# Patient Record
Sex: FEMALE | Race: OTHER | HISPANIC OR LATINO | Employment: OTHER | URBAN - NONMETROPOLITAN AREA
[De-identification: names, ages, dates, MRNs, and addresses within clinical notes are randomized per-mention and may not be internally consistent; named-entity substitution may affect disease eponyms.]

---

## 2023-06-22 ENCOUNTER — APPOINTMENT (EMERGENCY)
Dept: RADIOLOGY | Facility: HOSPITAL | Age: 73
End: 2023-06-22
Payer: MEDICARE

## 2023-06-22 ENCOUNTER — APPOINTMENT (EMERGENCY)
Dept: CT IMAGING | Facility: HOSPITAL | Age: 73
End: 2023-06-22
Payer: MEDICARE

## 2023-06-22 ENCOUNTER — HOSPITAL ENCOUNTER (EMERGENCY)
Facility: HOSPITAL | Age: 73
Discharge: HOME | End: 2023-06-22
Attending: EMERGENCY MEDICINE | Admitting: EMERGENCY MEDICINE
Payer: MEDICARE

## 2023-06-22 VITALS
HEART RATE: 95 BPM | SYSTOLIC BLOOD PRESSURE: 144 MMHG | DIASTOLIC BLOOD PRESSURE: 72 MMHG | HEIGHT: 62 IN | OXYGEN SATURATION: 91 % | RESPIRATION RATE: 23 BRPM | TEMPERATURE: 98.2 F | BODY MASS INDEX: 53.92 KG/M2 | WEIGHT: 293 LBS

## 2023-06-22 DIAGNOSIS — G44.86 CERVICOGENIC HEADACHE: Primary | ICD-10-CM

## 2023-06-22 DIAGNOSIS — R07.2 RETROSTERNAL CHEST PAIN: ICD-10-CM

## 2023-06-22 PROBLEM — F32.A DEPRESSION: Status: ACTIVE | Noted: 2021-09-16

## 2023-06-22 PROBLEM — M25.50 AROMATASE INHIBITOR-ASSOCIATED ARTHRALGIA: Status: ACTIVE | Noted: 2018-04-06

## 2023-06-22 PROBLEM — Z90.49 S/P LAPAROSCOPIC CHOLECYSTECTOMY: Status: ACTIVE | Noted: 2020-03-12

## 2023-06-22 PROBLEM — G43.709 CHRONIC MIGRAINE WITHOUT AURA WITHOUT STATUS MIGRAINOSUS, NOT INTRACTABLE: Status: ACTIVE | Noted: 2022-06-28

## 2023-06-22 PROBLEM — Z86.010 HISTORY OF COLON POLYPS: Status: ACTIVE | Noted: 2019-04-30

## 2023-06-22 PROBLEM — Z86.0100 HISTORY OF COLON POLYPS: Status: ACTIVE | Noted: 2019-04-30

## 2023-06-22 PROBLEM — M54.81 OCCIPITAL NEURALGIA OF LEFT SIDE: Status: ACTIVE | Noted: 2022-06-28

## 2023-06-22 PROBLEM — M94.0 COSTOCHONDRITIS: Status: ACTIVE | Noted: 2020-07-13

## 2023-06-22 PROBLEM — K29.70 GASTRITIS DETERMINED BY ENDOSCOPY: Status: ACTIVE | Noted: 2019-04-30

## 2023-06-22 PROBLEM — C50.111 MALIGNANT NEOPLASM OF CENTRAL PORTION OF RIGHT BREAST IN FEMALE, ESTROGEN RECEPTOR POSITIVE (HCC): Status: ACTIVE | Noted: 2018-04-06

## 2023-06-22 PROBLEM — T45.1X5A AROMATASE INHIBITOR-ASSOCIATED ARTHRALGIA: Status: ACTIVE | Noted: 2018-04-06

## 2023-06-22 PROBLEM — I10 HTN (HYPERTENSION): Status: ACTIVE | Noted: 2021-09-16

## 2023-06-22 PROBLEM — J44.9 COPD WITH ASTHMA (HCC): Status: ACTIVE | Noted: 2021-09-16

## 2023-06-22 PROBLEM — Z17.0 MALIGNANT NEOPLASM OF CENTRAL PORTION OF RIGHT BREAST IN FEMALE, ESTROGEN RECEPTOR POSITIVE (HCC): Status: ACTIVE | Noted: 2018-04-06

## 2023-06-22 PROBLEM — E66.01 MORBID OBESITY (HCC): Status: ACTIVE | Noted: 2023-03-07

## 2023-06-22 PROBLEM — J44.89 COPD WITH ASTHMA: Status: ACTIVE | Noted: 2021-09-16

## 2023-06-22 PROBLEM — M17.11 OSTEOARTHRITIS OF RIGHT KNEE: Status: ACTIVE | Noted: 2021-04-08

## 2023-06-22 LAB
2HR DELTA HS TROPONIN: 0 NG/L
ANION GAP SERPL CALCULATED.3IONS-SCNC: 7 MMOL/L
BASOPHILS # BLD AUTO: 0.05 THOUSANDS/ÂΜL (ref 0–0.1)
BASOPHILS NFR BLD AUTO: 1 % (ref 0–1)
BUN SERPL-MCNC: 12 MG/DL (ref 5–25)
CALCIUM SERPL-MCNC: 9 MG/DL (ref 8.4–10.2)
CARDIAC TROPONIN I PNL SERPL HS: 4 NG/L
CARDIAC TROPONIN I PNL SERPL HS: 4 NG/L
CHLORIDE SERPL-SCNC: 104 MMOL/L (ref 96–108)
CO2 SERPL-SCNC: 29 MMOL/L (ref 21–32)
CREAT SERPL-MCNC: 0.73 MG/DL (ref 0.6–1.3)
CRP SERPL QL: 3.8 MG/L
EOSINOPHIL # BLD AUTO: 0.14 THOUSAND/ÂΜL (ref 0–0.61)
EOSINOPHIL NFR BLD AUTO: 2 % (ref 0–6)
ERYTHROCYTE [DISTWIDTH] IN BLOOD BY AUTOMATED COUNT: 15.4 % (ref 11.6–15.1)
ERYTHROCYTE [SEDIMENTATION RATE] IN BLOOD: 18 MM/HOUR (ref 0–29)
GFR SERPL CREATININE-BSD FRML MDRD: 81 ML/MIN/1.73SQ M
GLUCOSE SERPL-MCNC: 97 MG/DL (ref 65–140)
HCT VFR BLD AUTO: 39.1 % (ref 34.8–46.1)
HGB BLD-MCNC: 12.2 G/DL (ref 11.5–15.4)
IMM GRANULOCYTES # BLD AUTO: 0.02 THOUSAND/UL (ref 0–0.2)
IMM GRANULOCYTES NFR BLD AUTO: 0 % (ref 0–2)
LYMPHOCYTES # BLD AUTO: 1.62 THOUSANDS/ÂΜL (ref 0.6–4.47)
LYMPHOCYTES NFR BLD AUTO: 23 % (ref 14–44)
MAGNESIUM SERPL-MCNC: 1.9 MG/DL (ref 1.9–2.7)
MCH RBC QN AUTO: 25.9 PG (ref 26.8–34.3)
MCHC RBC AUTO-ENTMCNC: 31.2 G/DL (ref 31.4–37.4)
MCV RBC AUTO: 83 FL (ref 82–98)
MONOCYTES # BLD AUTO: 0.9 THOUSAND/ÂΜL (ref 0.17–1.22)
MONOCYTES NFR BLD AUTO: 13 % (ref 4–12)
NEUTROPHILS # BLD AUTO: 4.3 THOUSANDS/ÂΜL (ref 1.85–7.62)
NEUTS SEG NFR BLD AUTO: 61 % (ref 43–75)
NRBC BLD AUTO-RTO: 0 /100 WBCS
PLATELET # BLD AUTO: 267 THOUSANDS/UL (ref 149–390)
PMV BLD AUTO: 10.2 FL (ref 8.9–12.7)
POTASSIUM SERPL-SCNC: 3.4 MMOL/L (ref 3.5–5.3)
RBC # BLD AUTO: 4.71 MILLION/UL (ref 3.81–5.12)
SODIUM SERPL-SCNC: 140 MMOL/L (ref 135–147)
WBC # BLD AUTO: 7.03 THOUSAND/UL (ref 4.31–10.16)

## 2023-06-22 PROCEDURE — 85025 COMPLETE CBC W/AUTO DIFF WBC: CPT | Performed by: EMERGENCY MEDICINE

## 2023-06-22 PROCEDURE — 93005 ELECTROCARDIOGRAM TRACING: CPT

## 2023-06-22 PROCEDURE — 70496 CT ANGIOGRAPHY HEAD: CPT

## 2023-06-22 PROCEDURE — 80048 BASIC METABOLIC PNL TOTAL CA: CPT | Performed by: EMERGENCY MEDICINE

## 2023-06-22 PROCEDURE — 36415 COLL VENOUS BLD VENIPUNCTURE: CPT | Performed by: EMERGENCY MEDICINE

## 2023-06-22 PROCEDURE — 83735 ASSAY OF MAGNESIUM: CPT | Performed by: EMERGENCY MEDICINE

## 2023-06-22 PROCEDURE — 71045 X-RAY EXAM CHEST 1 VIEW: CPT

## 2023-06-22 PROCEDURE — 70498 CT ANGIOGRAPHY NECK: CPT

## 2023-06-22 PROCEDURE — G1004 CDSM NDSC: HCPCS

## 2023-06-22 PROCEDURE — 84484 ASSAY OF TROPONIN QUANT: CPT | Performed by: EMERGENCY MEDICINE

## 2023-06-22 PROCEDURE — 85652 RBC SED RATE AUTOMATED: CPT | Performed by: EMERGENCY MEDICINE

## 2023-06-22 PROCEDURE — 86140 C-REACTIVE PROTEIN: CPT | Performed by: EMERGENCY MEDICINE

## 2023-06-22 RX ORDER — PREDNISONE 20 MG/1
40 TABLET ORAL DAILY
Qty: 8 TABLET | Refills: 0 | Status: SHIPPED | OUTPATIENT
Start: 2023-06-23 | End: 2023-06-27

## 2023-06-22 RX ORDER — POTASSIUM CHLORIDE 20 MEQ/1
40 TABLET, EXTENDED RELEASE ORAL ONCE
Status: COMPLETED | OUTPATIENT
Start: 2023-06-22 | End: 2023-06-22

## 2023-06-22 RX ORDER — METOCLOPRAMIDE HYDROCHLORIDE 5 MG/ML
10 INJECTION INTRAMUSCULAR; INTRAVENOUS ONCE
Status: COMPLETED | OUTPATIENT
Start: 2023-06-22 | End: 2023-06-22

## 2023-06-22 RX ORDER — MAGNESIUM SULFATE HEPTAHYDRATE 40 MG/ML
2 INJECTION, SOLUTION INTRAVENOUS ONCE
Status: COMPLETED | OUTPATIENT
Start: 2023-06-22 | End: 2023-06-22

## 2023-06-22 RX ORDER — IPRATROPIUM BROMIDE AND ALBUTEROL SULFATE 2.5; .5 MG/3ML; MG/3ML
3 SOLUTION RESPIRATORY (INHALATION) ONCE
Status: COMPLETED | OUTPATIENT
Start: 2023-06-22 | End: 2023-06-22

## 2023-06-22 RX ORDER — DEXAMETHASONE SODIUM PHOSPHATE 4 MG/ML
10 INJECTION, SOLUTION INTRA-ARTICULAR; INTRALESIONAL; INTRAMUSCULAR; INTRAVENOUS; SOFT TISSUE ONCE
Status: COMPLETED | OUTPATIENT
Start: 2023-06-22 | End: 2023-06-22

## 2023-06-22 RX ORDER — DIPHENHYDRAMINE HYDROCHLORIDE 50 MG/ML
25 INJECTION INTRAMUSCULAR; INTRAVENOUS ONCE
Status: COMPLETED | OUTPATIENT
Start: 2023-06-22 | End: 2023-06-22

## 2023-06-22 RX ORDER — SODIUM CHLORIDE 9 MG/ML
3 INJECTION INTRAVENOUS
Status: DISCONTINUED | OUTPATIENT
Start: 2023-06-22 | End: 2023-06-23 | Stop reason: HOSPADM

## 2023-06-22 RX ADMIN — MAGNESIUM SULFATE HEPTAHYDRATE 2 G: 40 INJECTION, SOLUTION INTRAVENOUS at 16:35

## 2023-06-22 RX ADMIN — VALPROATE SODIUM 1000 MG: 100 INJECTION, SOLUTION INTRAVENOUS at 20:55

## 2023-06-22 RX ADMIN — DIPHENHYDRAMINE HYDROCHLORIDE 25 MG: 50 INJECTION, SOLUTION INTRAMUSCULAR; INTRAVENOUS at 16:34

## 2023-06-22 RX ADMIN — IPRATROPIUM BROMIDE AND ALBUTEROL SULFATE 3 ML: .5; 3 SOLUTION RESPIRATORY (INHALATION) at 16:35

## 2023-06-22 RX ADMIN — IOHEXOL 85 ML: 350 INJECTION, SOLUTION INTRAVENOUS at 17:44

## 2023-06-22 RX ADMIN — DEXAMETHASONE SODIUM PHOSPHATE 10 MG: 4 INJECTION, SOLUTION INTRAMUSCULAR; INTRAVENOUS at 16:39

## 2023-06-22 RX ADMIN — POTASSIUM CHLORIDE 40 MEQ: 1500 TABLET, EXTENDED RELEASE ORAL at 17:59

## 2023-06-22 RX ADMIN — METOCLOPRAMIDE HYDROCHLORIDE 10 MG: 5 INJECTION INTRAMUSCULAR; INTRAVENOUS at 16:35

## 2023-06-22 NOTE — ED PROVIDER NOTES
"History  Chief Complaint   Patient presents with   • Chest Pain     Chest pressure that started this morning  Increase in shortness of breath complains of left sided head pain as well, and nausea  18-year-old who presents to the emergency department with her son for evaluation of 2 primary complaints:    1  Left-sided parietal/frontal headache extending into the left neck beginning on Monday, 19 June 2023 with no preceding trauma or injury  Associated with photophobia and nausea as well as phonophobia  Also states that she has had transient blurred vision from the left eye for the past several days without vision loss per se from the left eye and with no vision effects in the right eye  Patient reports a longstanding history of headaches of a similar character (including photophobia/phonophobia and blurred vision) although usually located in the temporoparietal region and not extending into the neck such as she is having now; these were diagnosed previously as migraine headaches  She has also been told that she has \"arthritis\" within multiple locations within her skull that may be contributing to her headaches  Has had previous cervical spine injections for the same symptoms that did not really improve her symptoms  Currently treated by headache specialist for the symptoms  She feels that the parietal region on the left as well as the neck on the left side are swollen and inflamed which is unusual for her  She has taken acetaminophen and ibuprofen as well as prescription migraine abortive therapy without improvement  She does not take any anticoagulant or antiplatelet therapy  A/P: May have a migraine headache but alternative possibilities including vascular pathology with dissection or giant cell arteritis are possible  We will check CTA head/neck as well as ESR/CRP  Will give migraine abortive therapies as well  Disposition pending      2   Retrosternal chest pain she describes as tightness " beginning this morning after awakening that did not respond to use of her inhaler at home  She feels mildly dyspneic since onset of the pain  She had been previously nauseated because of the headache as described and remains nauseated now  Not sweating at any point  No palpitations  no syncope  Pain does not radiate from the chest   It is not obviously affected by physical activity  It remains now essentially at the same intensity as at onset  Patient had taken inhalers suspecting that this might be related to her asthma, although as noted she did not have significant improvement after using an albuterol treatment  Does not have any underlying cardiac disease of which she is aware although does have history of hypertension/hyperlipidemia and aortic valve sclerosis per epic records  Has not had pain like this previously  A/P: DDx includes but is not limited to ACS/dissection/ptx/pna; relatively low concern for dissection given nature of sx and I do not suspect this diagnosis to be likely  Will assess ACS with ecg/troponin  Ptx/pna will be assessed by chest imaging  Does not have evidence of bronchospasm on examination decreases suspicion that this is related to asthma;  Nonetheless will see if symptoms respond to treatment with DuoNeb  History provided by:  Medical records, patient and relative (Patient's son )   used: No (Pateint's son prefers to interpret for patient)    Chest Pain  Associated symptoms: headache, nausea and shortness of breath    Associated symptoms: no abdominal pain, no numbness, not vomiting and no weakness        None       History reviewed  No pertinent past medical history  History reviewed  No pertinent surgical history  History reviewed  No pertinent family history  I have reviewed and agree with the history as documented      E-Cigarette/Vaping     E-Cigarette/Vaping Substances     Social History     Tobacco Use   • Smoking status: Never   • Smokeless tobacco: Never       Review of Systems   Constitutional: Negative  Eyes: Positive for photophobia and visual disturbance  Negative for pain and redness  Respiratory: Positive for chest tightness and shortness of breath  Negative for choking  Cardiovascular: Positive for chest pain  Gastrointestinal: Positive for nausea  Negative for abdominal pain and vomiting  Genitourinary: Negative  Musculoskeletal: Negative  Negative for neck pain and neck stiffness  Skin: Negative  Neurological: Positive for headaches  Negative for syncope, speech difficulty, weakness, light-headedness and numbness  Hematological: Negative  Physical Exam  Physical Exam  Vitals and nursing note reviewed  Constitutional:       General: She is awake  She is not in acute distress  Appearance: Normal appearance  She is well-developed  HENT:      Head: Normocephalic and atraumatic  Comments: Tenderness along the posterior aspect of the left temporal region extending into the left parietal region, the left occipital region, and the left inferior aspect of the neck with no significant muscular hypertonicity or overlying skin changes  No tenderness of the temporal artery per se  No masses/swelling/abnormal texture of the temporal artery  Right Ear: Hearing and external ear normal       Left Ear: Hearing and external ear normal    Eyes:      General: Vision grossly intact  Gaze aligned appropriately  Extraocular Movements: Extraocular movements intact  Conjunctiva/sclera: Conjunctivae normal       Pupils: Pupils are equal, round, and reactive to light  Neck:      Trachea: Trachea and phonation normal       Comments: No posterior midline ttp or stepoff  Cardiovascular:      Rate and Rhythm: Normal rate and regular rhythm  Pulses:           Radial pulses are 2+ on the right side and 2+ on the left side          Dorsalis pedis pulses are 2+ on the right side and 2+ on the left side         Posterior tibial pulses are 2+ on the right side and 2+ on the left side  Heart sounds: Normal heart sounds, S1 normal and S2 normal  No murmur heard  No friction rub  No gallop  Pulmonary:      Effort: Pulmonary effort is normal  No respiratory distress  Breath sounds: Normal breath sounds  No stridor  No decreased breath sounds, wheezing, rhonchi or rales  Abdominal:      General: There is no distension  Palpations: There is no mass  Tenderness: There is no abdominal tenderness  There is no guarding or rebound  Skin:     General: Skin is warm and dry  Neurological:      Mental Status: She is alert and oriented to person, place, and time  GCS: GCS eye subscore is 4  GCS verbal subscore is 5  GCS motor subscore is 6  Cranial Nerves: No cranial nerve deficit  Sensory: No sensory deficit  Motor: No abnormal muscle tone  Comments: PERRLA; EOMI  Sensation intact to light touch over face in V1-V3 distribution bilaterally  Facial expressions symmetric  Tongue/uvula midline  Shoulder shrug equal bilaterally  Strength 5/5 in UE/LE bilaterally  Sensation intact to light touch in UE/LE bilaterally           Vital Signs  ED Triage Vitals   Temperature Pulse Respirations Blood Pressure SpO2   06/22/23 1530 06/22/23 1445 06/22/23 1445 06/22/23 1530 06/22/23 1445   98 2 °F (36 8 °C) 89 (!) 25 136/63 95 %      Temp Source Heart Rate Source Patient Position - Orthostatic VS BP Location FiO2 (%)   06/22/23 1530 06/22/23 1530 06/22/23 1530 06/22/23 1530 --   Temporal Monitor Lying Left arm       Pain Score       --                  Vitals:    06/22/23 1645 06/22/23 1900 06/22/23 1915 06/22/23 2128   BP:    144/72   Pulse: 87 95 95    Patient Position - Orthostatic VS:             Visual Acuity      ED Medications  Medications   sodium chloride (PF) 0 9 % injection 3 mL (has no administration in time range)   diphenhydrAMINE (BENADRYL) injection 25 mg (25 mg Intravenous Given 6/22/23 1634)   metoclopramide (REGLAN) injection 10 mg (10 mg Intravenous Given 6/22/23 1635)   magnesium sulfate 2 g/50 mL IVPB (premix) 2 g (0 g Intravenous Stopped 6/22/23 1806)   ipratropium-albuterol (DUO-NEB) 0 5-2 5 mg/3 mL inhalation solution 3 mL (3 mL Nebulization Given 6/22/23 1635)   dexamethasone (DECADRON) injection 10 mg (10 mg Intravenous Given 6/22/23 1639)   potassium chloride (K-DUR,KLOR-CON) CR tablet 40 mEq (40 mEq Oral Given 6/22/23 1759)   iohexol (OMNIPAQUE) 350 MG/ML injection (SINGLE-DOSE) 85 mL (85 mL Intravenous Given 6/22/23 1744)   valproate (DEPACON) 1,000 mg in sodium chloride 0 9 % 50 mL for headache (0 mg Intravenous Stopped 6/22/23 2116)       Diagnostic Studies  Results Reviewed     Procedure Component Value Units Date/Time    HS Troponin I 2hr [[id number]]  (Normal) Collected: 06/22/23 1820    Lab Status: Final result Specimen: Blood from Arm, Right Updated: 06/22/23 1847     hs TnI 2hr 4 ng/L      Delta 2hr hsTnI 0 ng/L     HS Troponin 0hr (reflex protocol) [[id number]]  (Normal) Collected: 06/22/23 1611    Lab Status: Final result Specimen: Blood from Arm, Right Updated: 06/22/23 1639     hs TnI 0hr 4 ng/L     Sedimentation rate, automated [[id number]]  (Normal) Collected: 06/22/23 1611    Lab Status: Final result Specimen: Blood from Arm, Right Updated: 06/22/23 1638     Sed Rate 18 mm/hour     Basic metabolic panel [[id number]]  (Abnormal) Collected: 06/22/23 1611    Lab Status: Final result Specimen: Blood from Arm, Right Updated: 06/22/23 1632     Sodium 140 mmol/L      Potassium 3 4 mmol/L      Chloride 104 mmol/L      CO2 29 mmol/L      ANION GAP 7 mmol/L      BUN 12 mg/dL      Creatinine 0 73 mg/dL      Glucose 97 mg/dL      Calcium 9 0 mg/dL      eGFR 81 ml/min/1 73sq m     Narrative:      [place] guidelines for Chronic Kidney Disease (CKD):   •  Stage 1 with normal or high GFR (GFR > 90 mL/min/1 73 square meters)  •  Stage 2 Mild CKD (GFR = 60-89 mL/min/1 73 square meters)  •  Stage 3A Moderate CKD (GFR = 45-59 mL/min/1 73 square meters)  •  Stage 3B Moderate CKD (GFR = 30-44 mL/min/1 73 square meters)  •  Stage 4 Severe CKD (GFR = 15-29 mL/min/1 73 square meters)  •  Stage 5 End Stage CKD (GFR <15 mL/min/1 73 square meters)  Note: GFR calculation is accurate only with a steady state creatinine    Magnesium [[id number]]  (Normal) Collected: 06/22/23 1611    Lab Status: Final result Specimen: Blood from Arm, Right Updated: 06/22/23 1632     Magnesium 1 9 mg/dL     C-reactive protein [[id number]]  (Abnormal) Collected: 06/22/23 1611    Lab Status: Final result Specimen: Blood from Arm, Right Updated: 06/22/23 1632     CRP 3 8 mg/L     CBC and differential [[id number]]  (Abnormal) Collected: 06/22/23 1611    Lab Status: Final result Specimen: Blood from Arm, Right Updated: 06/22/23 1615     WBC 7 03 Thousand/uL      RBC 4 71 Million/uL      Hemoglobin 12 2 g/dL      Hematocrit 39 1 %      MCV 83 fL      MCH 25 9 pg      MCHC 31 2 g/dL      RDW 15 4 %      MPV 10 2 fL      Platelets 786 Thousands/uL      nRBC 0 /100 WBCs      Neutrophils Relative 61 %      Immat GRANS % 0 %      Lymphocytes Relative 23 %      Monocytes Relative 13 %      Eosinophils Relative 2 %      Basophils Relative 1 %      Neutrophils Absolute 4 30 Thousands/µL      Immature Grans Absolute 0 02 Thousand/uL      Lymphocytes Absolute 1 62 Thousands/µL      Monocytes Absolute 0 90 Thousand/µL      Eosinophils Absolute 0 14 Thousand/µL      Basophils Absolute 0 05 Thousands/µL                  CTA head and neck with and without contrast   Final Result by Tori Matt MD (06/22 1948)         1  No evidence of acute intracranial hemorrhage  2  No evidence of hemodynamic significant stenosis, aneurysm or dissection  Workstation performed: [id number]         X-ray chest 1 view portable   ED Interpretation by Ventura Pedro DO (06/22 1631)   Airway is midline  Lungs are clear bilaterally with no evidence of pulmonary vascular congestion/focal infiltrate/pleural effusion/pneumothorax  Cardiac and mediastinal silhouettes are within normal limits  Osseous structures appear normal                    Procedures  Procedures         ED Course  ED Course as of 06/22/23 2225   Thu Jun 22, 2023   1554 ECG NSR 87 bpm   QRS 72 QTc 447  No ectopy  Normal axis  LVH  Mild baseline variability  No acute st/t changes  Interpreted by me   1619 CBC and differential(!)  WBC wnl  Hg/Hct wnl  Plt wnl   1649 Sedimentation rate, automated  Normal   1649 Magnesium  Normal   7545 Basic metabolic panel(!)  Hypokalemia  Otherwise normal    1649 C-reactive protein(!)  Very mildly elevated   1649 HS Troponin 0hr (reflex protocol)  Not consistent with ACS; will repeat at T+ 2 hours  1724 The very minimally elevated C-reactive protein and normal ESR this patient with an existing diagnosis of cervicogenic headache/recurrent migraine headache argues against this being giant cell arteritis  I would in fact suspect that this is likely to be a presentation of her migrainous disease, assuming the CTA does not demonstrate any other pathology that accounts for her symptoms  [phone] Patient and her son updated regarding results of work-up thus far  Will be going to CTA shortly  1756 CTA completed and awaiting interpretation  1847 HS Troponin I 2hr  Delta of 0 not consistent with ACS   1951 CTA head and neck with and without contrast  IMPRESSION:        1  No evidence of acute intracranial hemorrhage  2  No evidence of hemodynamic significant stenosis, aneurysm or dissection  1951 Given the results of the work-up, patient's headache is almost certainly related to underlying migrainous/cervicogenic headache syndrome and does not represent GCA/vascular dissection/aneurysmal subarachnoid hemorrhage/etc   Would direct her to her neurologist for further evaluation as indicated    Reviewed this plan with the patient and her son  She is still having moderate amount of pain for which administration of valproate may be reasonable  Patient reevaluated; reports mild improvement of symptoms  Repeat examination finds the patient awake and alert  The patient is in no respiratory distress  The patient is phonating in full sentences with no evidence of dysphonia/stridor  Repeat lung examination finds mildly improved aeration bilaterally with occasional scattered wheezes and no crackles/rhonchi  Presence of mild wheezing after treatment with DuoNeb suggests that dyspnea/chest pain patient is experiencing may be secondary to mild COPD exacerbation  Would add steroid at discharge in addition to the dexamethasone patient received today  Also advised continued use of bronchodilator treatment  2131 Headache markedly improved after valproate administration  Reviewed results of work-up again with the patient and her son  Will prescribe short course of prednisone to address any component of symptoms related to COPD exacerbation, which may in fact be accounting for patient's chest pain  Headache as noted almost certainly related to cervicogenic/migraine picture  Courage patient to see neurologist that she may benefit from additional migraine preventative therapies  She did show me a prescription for aspirin/butalbital that she has been described for the past several months but notes that it does not help for her  She may benefit from medication such as valproate or topiramate to prevent migraine headaches  Again, this should be discussed in full with her neurologist   Return for any signs or symptoms of ACS  All questions were answered to patient's satisfaction prior to discharge  She and her son expressed understanding and agreed to plan  SBIRT 20yo+    Flowsheet Row Most Recent Value   Initial Alcohol Screen: US AUDIT-C     1   How often do you have a drink containing alcohol? 0 Filed at: 06/22/2023 1438   2  How many drinks containing alcohol do you have on a typical day you are drinking? 0 Filed at: 06/22/2023 1438   3b  FEMALE Any Age, or MALE 65+: How often do you have 4 or more drinks on one occassion? 0 Filed at: 06/22/2023 1438   Audit-C Score 0 Filed at: 06/22/2023 1438   RINKU: How many times in the past year have you    Used an illegal drug or used a prescription medication for non-medical reasons? Never Filed at: 06/22/2023 1438          MDM    Disposition  Final diagnoses:   Mixed cervicogenic/migraine headache   Retrosternal chest pain     Time reflects when diagnosis was documented in both MDM as applicable and the Disposition within this note     Time User Action Codes Description Comment    6/22/2023  9:24 PM Rozena Eron Add [G44 86] Cervicogenic headache     6/22/2023  9:24 PM Rozena Barge Modify [L17 66] Mixed cervicogenic/migraine headache     6/22/2023  9:24 PM Rozena Bargayatri Add [R07 2] Retrosternal chest pain       ED Disposition     ED Disposition   Discharge    Condition   Stable    Date/Time   Thu Jun 22, 2023  9:24 PM    Comment   Bety Arreola discharge to home/self care  Follow-up Information    None         Discharge Medication List as of 6/22/2023  9:26 PM      START taking these medications    Details   predniSONE 20 mg tablet Take 2 tablets (40 mg total) by mouth daily for 4 days Do not start before June 23, 2023 , Starting Fri 6/23/2023, Until Tue 6/27/2023, Normal             No discharge procedures on file      PDMP Review     None          ED Provider  Electronically Signed by           Leonidas Jones DO  06/22/23 2222

## 2023-06-23 LAB
ATRIAL RATE: 87 BPM
P AXIS: 51 DEGREES
PR INTERVAL: 154 MS
QRS AXIS: -19 DEGREES
QRSD INTERVAL: 72 MS
QT INTERVAL: 372 MS
QTC INTERVAL: 447 MS
T WAVE AXIS: 30 DEGREES
VENTRICULAR RATE: 87 BPM

## 2023-06-23 PROCEDURE — 93010 ELECTROCARDIOGRAM REPORT: CPT | Performed by: INTERNAL MEDICINE

## 2023-06-23 NOTE — DISCHARGE INSTRUCTIONS
Please continue all your medications at their current dosages and frequencies  You have been prescribed prednisone which you should start taking tomorrow (June 23)  You should make an appointment with your neurologist in the next 2-3 weeks to discuss further  If you develop chest pain that is worse with physical activity, chest pain that causes you to pass out, chest pain with vomiting, or chest pain that moves out of your chest, please go to the ER

## 2023-10-12 ENCOUNTER — OFFICE VISIT (OUTPATIENT)
Dept: URGENT CARE | Facility: CLINIC | Age: 73
End: 2023-10-12
Payer: COMMERCIAL

## 2023-10-12 VITALS — HEART RATE: 85 BPM | TEMPERATURE: 97.9 F | RESPIRATION RATE: 20 BRPM | OXYGEN SATURATION: 94 %

## 2023-10-12 DIAGNOSIS — J01.90 ACUTE SINUSITIS, RECURRENCE NOT SPECIFIED, UNSPECIFIED LOCATION: Primary | ICD-10-CM

## 2023-10-12 PROCEDURE — 99213 OFFICE O/P EST LOW 20 MIN: CPT

## 2023-10-12 PROCEDURE — S9088 SERVICES PROVIDED IN URGENT: HCPCS

## 2023-10-12 RX ORDER — AMOXICILLIN AND CLAVULANATE POTASSIUM 875; 125 MG/1; MG/1
1 TABLET, FILM COATED ORAL EVERY 12 HOURS SCHEDULED
Qty: 14 TABLET | Refills: 0 | Status: SHIPPED | OUTPATIENT
Start: 2023-10-12 | End: 2023-10-19

## 2023-10-12 RX ORDER — FLUTICASONE PROPIONATE 50 MCG
2 SPRAY, SUSPENSION (ML) NASAL DAILY
Qty: 11.1 ML | Refills: 0 | Status: SHIPPED | OUTPATIENT
Start: 2023-10-12

## 2023-10-12 NOTE — PROGRESS NOTES
[facility]        NAME: Kelechi Jacob is a 75 y.o. female  : 1950    MRN: [id number]  DATE: 2023  TIME: 7:56 PM    Assessment and Plan   Acute sinusitis, recurrence not specified, unspecified location [J01.90]  1. Acute sinusitis, recurrence not specified, unspecified location  amoxicillin-clavulanate (AUGMENTIN) 875-125 mg per tablet    fluticasone (FLONASE) 50 mcg/act nasal spray        Maxillary and frontal sinus pressure, pain, and tenderness on exam.  PT has history of sinus infections in the past.  Will start on Augmentin. Also sending in Flonase and recommended nasal saline rinses. given advice for at home remedies. Advised follow-up with family doctor if no improvement. Advised with emergency room if symptoms worsen. Patient Instructions     Take prescribed medication as instructed. Do not take on an empty stomach. Eat yogurt with active and live cultures prior to or after taking each antibiotic dose. Tylenol for pain or fever. Make sure to do prescribed nasal spray followed by nasal saline spray over-the-counter twice daily. Continue with asthma regimen inhalers. If no improvement, follow-up with your family doctor. Go to the emergency room if any symptoms worsen. Follow up with PCP in 3-5 days. Proceed to  ER if symptoms worsen. Chief Complaint     Chief Complaint   Patient presents with    Facial Pain     With left ear pain onset 4 days ago did not take any otc meds         History of Present Illness       54-year-old female here with daughter ( used) for 4 to 5 days of sinus pressure, congestion, tenderness/pain. PT did not try over-the-counter medications. Denies any sick contacts. History of sinus issues in the past.  Denies any fever, chills, chest pain, shortness of breath, abdominal pain, nausea, vomiting, diarrhea.   PT states she has history of asthma-has albuterol inhaler and nebulizer (has not been using them more than normal). Review of Systems   Review of Systems   Constitutional: Negative. HENT:  Positive for congestion, rhinorrhea, sinus pressure and sinus pain. Negative for ear discharge, ear pain and sore throat. Eyes: Negative. Respiratory:  Positive for cough. Negative for shortness of breath and wheezing. Cardiovascular: Negative. Gastrointestinal: Negative. Musculoskeletal: Negative. Skin: Negative. Neurological: Negative.           Current Medications       Current Outpatient Medications:     amoxicillin-clavulanate (AUGMENTIN) 875-125 mg per tablet, Take 1 tablet by mouth every 12 (twelve) hours for 7 days, Disp: 14 tablet, Rfl: 0    fluticasone (FLONASE) 50 mcg/act nasal spray, 2 sprays into each nostril daily, Disp: 11.1 mL, Rfl: 0    albuterol (2.5 mg/3 mL) 0.083 % nebulizer solution, , Disp: , Rfl:     albuterol (PROVENTIL HFA,VENTOLIN HFA) 90 mcg/act inhaler, Inhale 2 puffs every 6 (six) hours as needed, Disp: , Rfl:     ALPRAZolam (XANAX) 1 mg tablet, , Disp: , Rfl:     amLODIPine (NORVASC) 5 mg tablet, , Disp: , Rfl:     benzonatate (TESSALON) 200 MG capsule, Take 1 capsule (200 mg total) by mouth 3 (three) times a day as needed for cough, Disp: 30 capsule, Rfl: 0    Cholecalciferol (Vitamin D) 50 MCG (2000 UT) tablet, , Disp: , Rfl:     Fluticasone-Salmeterol (Advair) 250-50 mcg/dose inhaler, INHALE 1 DOSE BY MOUTH TWICE DAILY, Disp: , Rfl:     ibuprofen (MOTRIN) 600 mg tablet, TAKE 1 TABLET BY MOUTH EVERY 8 HOURS AS NEEDED FOR MILD PAIN (PAIN SCORE 1-3), Disp: , Rfl:     montelukast (SINGULAIR) 10 mg tablet, Take 10 mg by mouth daily, Disp: , Rfl:     omeprazole (PriLOSEC) 40 MG capsule, Take 40 mg by mouth daily, Disp: , Rfl:     propranolol (INDERAL) 10 mg tablet, Take 10 mg by mouth daily as needed, Disp: , Rfl:     risedronate (ACTONEL) 35 mg tablet, , Disp: , Rfl:     sertraline (ZOLOFT) 100 mg tablet, , Disp: , Rfl:     traZODone (DESYREL) 100 mg tablet, , Disp: , Rfl: Current Allergies     Allergies as of 10/12/2023 - Reviewed 10/12/2023   Allergen Reaction Noted    Naproxen Rash and Other (See Comments) 10/24/2011    Naproxen Rash 08/04/2023            The following portions of the patient's history were reviewed and updated as appropriate: allergies, current medications, past family history, past medical history, past social history, past surgical history and problem list.     Past Medical History:   Diagnosis Date    Depression     Hypertension        No past surgical history on file. No family history on file. Medications have been verified. Objective   Pulse 85   Temp 97.9 °F (36.6 °C)   Resp 20   SpO2 94%        Physical Exam     Physical Exam  Constitutional:       General: She is not in acute distress. Appearance: Normal appearance. She is not ill-appearing, toxic-appearing or diaphoretic. HENT:      Head: Normocephalic and atraumatic. Right Ear: Tympanic membrane, ear canal and external ear normal.      Left Ear: Tympanic membrane, ear canal and external ear normal.      Nose: Congestion and rhinorrhea present. Right Sinus: Maxillary sinus tenderness and frontal sinus tenderness present. Left Sinus: Maxillary sinus tenderness and frontal sinus tenderness present. Mouth/Throat:      Mouth: Mucous membranes are moist.      Pharynx: Oropharynx is clear. No oropharyngeal exudate. Eyes:      Extraocular Movements: Extraocular movements intact. Conjunctiva/sclera: Conjunctivae normal.      Pupils: Pupils are equal, round, and reactive to light. Cardiovascular:      Rate and Rhythm: Normal rate and regular rhythm. Pulses: Normal pulses. Heart sounds: Murmur (Chronic per review of health history.) heard. No friction rub. No gallop. Pulmonary:      Effort: Pulmonary effort is normal. No respiratory distress. Breath sounds: Normal breath sounds. No stridor. No wheezing, rhonchi or rales.    Chest: Chest wall: No tenderness. Musculoskeletal:      Cervical back: Normal range of motion and neck supple. Skin:     General: Skin is warm and dry. Capillary Refill: Capillary refill takes less than 2 seconds. Findings: No rash. Neurological:      General: No focal deficit present. Mental Status: She is alert and oriented to person, place, and time. Mental status is at baseline.    Psychiatric:         Mood and Affect: Mood normal.

## 2023-10-12 NOTE — PATIENT INSTRUCTIONS
Take prescribed medication as instructed. Do not take on an empty stomach. Eat yogurt with active and live cultures prior to or after taking each antibiotic dose. Tylenol for pain or fever. Make sure to do prescribed nasal spray followed by nasal saline spray over-the-counter twice daily. Continue with asthma regimen inhalers. If no improvement, follow-up with your family doctor. Go to the emergency room if any symptoms worsen. Follow up with PCP in 3-5 days. Proceed to  ER if symptoms worsen. Sinusitis   LO QUE NECESITA SABER:   La sinusitis es la inflamación o infección de los senos paranasales. La mayoría de las veces, la causa de la sinusitis es un virus. La sinusitis aguda podría durar hasta 12 semanas. La sinusitis crónica dura más de 12 semanas. La sinusitis recurrente significa que tiene 4 o más infecciones en 1 año. INSTRUCCIONES SOBRE EL CHLOE HOSPITALARIA:   Regrese a la joanna de emergencias si:  Deannie Asai para respirar o sibilancia que empeora. Usted tiene el joellen rígido, fiebre o un james dolor de jun. Usted no puede abrir [place]. [place] globo ocular sobresale o usted no puede  [place] olman. Usted tiene más sueño de lo normal o nota cambios en [place] habilidad de pensar, moverse o hablar. Usted tiene [employer] frente o el cuero cabelludo. Llame a [place] médico si:  Usted tiene [employer] visión, felipe visión doble. [place] olman y párpado están enrojecidos, inflamados y adoloridos. June síntomas no mejoran ni desaparecen después de 10 días. Usted tiene náuseas y vómitos. Le sangra la Michelle Son. Usted tiene preguntas o inquietudes acerca de [place] condición o cuidado. Medicamentos: June síntomas podrían desaparecer por sí solos. [place] médico puede recomendar radha espera atenta hasta 10 días antes de iniciar el tratamiento con antibióticos. Es posible que usted necesite alguno de los siguientes:  Acetaminofén ashley el dolor y baja la fiebre.  Está disponible sin receta médica. Pregunte la cantidad y la frecuencia con que debe tomarlos. [address] Idalmis las etiquetas de todos los demás medicamentos que esté usando para saber si también contienen acetaminofén, o pregunte a [place] médico o farmacéutico. El acetaminofén puede causar daño en el hígado cuando no se edouard de forma correcta. TOÑA felipe el ibuprofeno, ayudan a disminuir la inflamación, el dolor y la [place]. Ashley medicamento está disponible con o sin radha receta médica. Los TOÑA pueden causar sangrado estomacal o problemas renales en ciertas personas. Si usted edouard un medicamento anticoagulante, siempre pregúntele a [place] médico si los TOÑA son seguros para usted. Siempre idalmis la etiqueta de ashley medicamento y [address] instrucciones. Los [employer] nasales con esteroides podrían ayudar a disminuir la inflamación de [place] nariz y senos paranasales. Los descongestivos ayudan a reducir la inflamación y a drenar la mucosidad en la nariz y los senos paranasales. Los descongestionantes podrían ayudarle a respirar más fácilmente. Los antihistamínicos ayudan a secar la mucosidad en [place] y a aliviar los estornudos. Los antibióticos ayudan a tratar o prevenir infecciones bacterianas. [place] vinayak medicamentos felipe se le haya indicado. Consulte con [place] médico si usted cayden que [place] medicamento no le está ayudando o si presenta efectos secundarios. Infórmele al médico si usted es alérgico a algún medicamento. Mantenga radha lista actualizada de los [place], las vitaminas y los productos herbales que edouard. Incluya los siguientes datos de los medicamentos: cantidad, frecuencia y motivo de administración. Traiga con usted la lista o los envases de las píldoras a vinayak citas de seguimiento. Lleve la lista de los medicamentos con usted en gonzalo de radha emergencia. Cuidados personales:  Enjuáguese los senos paranasales felipe se lo hayan indicado.  Use un aparato para enjuagar vinayak fosas nasales con radha solución salina (agua salada) o agua destilada. No use agua de la llave. [place] ayudará a diluir la mucosidad en la nariz eliminando el polen y la suciedad. También ayudará a reducir la inflamación para que usted pueda respirar normalmente. Use un humidificador para aumentar el nivel de humedad en el aire de [place] hogar. [place] podría ayudarle a respirar más fácilmente y al mismo tiempo disminuir la tos. [place] con la [place]. Coloque radha almohada adicional debajo de [place] jun antes de dormir para ayudar a drenar vinayak senos paranasales. [place] líquidos felipe se le haya indicado. Pregunte a [place] médico sobre la cantidad de líquido que necesita poornima todos los odilia y cuáles le recomienda. Los líquidos van a diluir la mucosidad en s[place] [facility] y [facility] a drenarla. Evite las bebidas que contienen alcohol o cafeína. No fume y evite el humo de [place]. La nicotina y otros químicos en los cigarrillos y cigarros pueden empeorar vinayak síntomas. Pida información a [place] médico si usted actualmente fuma y necesita ayuda para dejar de fumar. Los cigarrillos electrónicos o el tabaco sin humo igualmente contienen nicotina. Consulte con [place] médico antes de [employer]. Prevenga la propagación de gérmenes:  Lávese las jaycob frecuentemente con agua y Ian. [employer] las jaycob después de usar el baño, cambiarle el pañal a un jhonathan o estornudar. Lávese las jaycob antes de comer o preparar alimentos. Aléjese de la gente enferma. Algunos microbios se propagan fácil y rápidamente con el contacto. Acuda a la consulta de control con [place] médico según las indicaciones: Usted puede ser referido a un especialista en garganta, oído y nariz. Anote vinayak preguntas para que se acuerde de hacerlas finn vinayak visitas. © Copyright [facility] 2023 Information is for End User's use only and may not be sold, redistributed or otherwise used for commercial purposes. Esta información es sólo para uso en educación.  [place] intención no es darle un consejo [employer] enfermedades o tratamientos. Colsulte con sutherland Camilla Timothy farmacéutico antes de seguir cualquier régimen médico para saber si es seguro y efectivo para usted.

## 2023-11-14 ENCOUNTER — OFFICE VISIT (OUTPATIENT)
Dept: URGENT CARE | Facility: CLINIC | Age: 73
End: 2023-11-14
Payer: COMMERCIAL

## 2023-11-14 VITALS
DIASTOLIC BLOOD PRESSURE: 60 MMHG | TEMPERATURE: 98 F | RESPIRATION RATE: 17 BRPM | SYSTOLIC BLOOD PRESSURE: 104 MMHG | OXYGEN SATURATION: 98 % | HEART RATE: 83 BPM

## 2023-11-14 DIAGNOSIS — H65.192 ACUTE EFFUSION OF LEFT EAR: ICD-10-CM

## 2023-11-14 DIAGNOSIS — J01.40 ACUTE PANSINUSITIS, RECURRENCE NOT SPECIFIED: Primary | ICD-10-CM

## 2023-11-14 PROCEDURE — S9088 SERVICES PROVIDED IN URGENT: HCPCS

## 2023-11-14 PROCEDURE — 99213 OFFICE O/P EST LOW 20 MIN: CPT

## 2023-11-14 RX ORDER — AMOXICILLIN AND CLAVULANATE POTASSIUM 875; 125 MG/1; MG/1
1 TABLET, FILM COATED ORAL EVERY 12 HOURS SCHEDULED
Qty: 14 TABLET | Refills: 0 | Status: SHIPPED | OUTPATIENT
Start: 2023-11-14 | End: 2023-11-21

## 2023-11-14 NOTE — PATIENT INSTRUCTIONS
Take prescribed medication as instructed. Take with food avoid upset stomach. Eat yogurt with active and live culture. Tylenol or ibuprofen for pain/fever. Follow instructions on labeling for dosing and frequency. Do not take ibuprofen on an empty stomach. May try nasal saline rinses and previously prescribed Flonase twice daily. If no improvement, follow-up with your family doctor. Go to the emergency room if symptoms worsen. Follow up with PCP in 3-5 days. Proceed to  ER if symptoms worsen. Sinusitis   LO QUE NECESITA SABER:   La sinusitis es la inflamación o infección de los senos paranasales. La mayoría de las veces, la causa de la sinusitis es un virus. La sinusitis aguda podría durar hasta 12 semanas. La sinusitis crónica dura más de 12 semanas. La sinusitis recurrente significa que tiene 4 o más infecciones en 1 año. INSTRUCCIONES SOBRE EL CHLOE HOSPITALARIA:   Regrese a la joanna de emergencias si:  Keira Filippo para respirar o sibilancia que empeora. Usted tiene el joellen rígido, fiebre o un james dolor de jun. Usted no puede abrir [place]. [place] globo ocular sobresale o usted no puede  [place] olman. Usted tiene más sueño de lo normal o nota cambios en [place] habilidad de pensar, moverse o hablar. Usted tiene [employer] frente o el cuero cabelludo. Llame a [place] médico si:  Usted tiene [employer] visión, felipe visión doble. [place] olman y párpado están enrojecidos, inflamados y adoloridos. June síntomas no mejoran ni desaparecen después de 10 días. Usted tiene náuseas y vómitos. Le sangra la Ronphilip Herddonn. Usted tiene preguntas o inquietudes acerca de [place] condición o cuidado. Medicamentos: June síntomas podrían desaparecer por sí solos. [place] médico puede recomendar radha espera atenta hasta 10 días antes de iniciar el tratamiento con antibióticos. Es posible que usted necesite alguno de los siguientes:  Acetaminofén ashley el dolor y baja la fiebre.  Está disponible sin receta médica. Pregunte la cantidad y la frecuencia con que debe tomarlos. [address] Idalmis las etiquetas de todos los demás medicamentos que esté usando para saber si también contienen acetaminofén, o pregunte a [place] médico o farmacéutico. El acetaminofén puede causar daño en el hígado cuando no se edouard de forma correcta. TOÑA felipe el ibuprofeno, ayudan a disminuir la inflamación, el dolor y la [place]. [place] medicamento está disponible con o sin radha receta médica. Los TOÑA pueden causar sangrado estomacal o problemas renales en ciertas personas. Si usted edouard un medicamento anticoagulante, siempre pregúntele a [place] médico si los TOÑA son seguros para usted. Siempre idalmis la etiqueta de ashley medicamento y [address] instrucciones. [employer] nasales con esteroides podrían ayudar a disminuir la inflamación de [place] nariz y senos paranasales. Los descongestivos ayudan a reducir la inflamación y a drenar la mucosidad en la nariz y los senos paranasales. Los descongestionantes podrían ayudarle a respirar más fácilmente. Los antihistamínicos ayudan a secar la mucosidad en [place] Brittney Failing y a aliviar los estornudos. Los antibióticos ayudan a tratar o prevenir infecciones bacterianas. [place] vinayak medicamentos felipe se le haya indicado. Consulte con [place] médico si usted cayden que [place] medicamento no le está ayudando o si presenta efectos secundarios. Infórmele al médico si usted es alérgico a algún medicamento. Mantenga radha lista actualizada de los [place], las vitaminas y los productos herbales que edouard. Incluya los siguientes datos de los medicamentos: cantidad, frecuencia y motivo de administración. Traiga con usted la lista o los envases de las píldoras a vinayak citas de seguimiento. Lleve la lista de los medicamentos con usted en gonzalo de radha emergencia. Cuidados personales:  Enjuáguese los senos paranasales felipe se lo hayan indicado.  Use un aparato para enjuagar vinayak fosas nasales con radha solución salina (agua salada) o agua destilada. No use agua de la llave. [place] ayudará a diluir la mucosidad en la nariz eliminando el polen y la suciedad. También ayudará a reducir la inflamación para que usted pueda respirar normalmente. Use un humidificador para aumentar el nivel de humedad en el aire de gonzalez hogar. [place] podría ayudarle a respirar más fácilmente y al mismo tiempo disminuir la tos. [place] con la [place]. Coloque radha almohada adicional debajo de gonzalez jun antes de dormir para ayudar a drenar vinayak senos paranasales. [place] líquidos felipe se le haya indicado. Pregunte a gonzalez médico sobre la cantidad de líquido que necesita poornima todos los odilia y cuáles le recomienda. Los líquidos van a diluir la mucosidad en gonzalez [facility] y [facility] a drenarla. Evite las bebidas que contienen alcohol o cafeína. No fume y evite el humo de [place]. La nicotina y otros químicos en los cigarrillos y cigarros pueden empeorar vinayak síntomas. Pida información a gonzalez médico si usted actualmente fuma y necesita ayuda para dejar de fumar. Los cigarrillos electrónicos o el tabaco sin humo igualmente contienen nicotina. Consulte con gonzalez médico antes de [employer]. Prevenga la propagación de gérmenes:  Lávese las jaycob frecuentemente con agua y Ian. [employer] las jaycob después de usar el baño, cambiarle el pañal a un jhonathan o estornudar. Lávese las jaycob antes de comer o preparar alimentos. Aléjese de la gente enferma. Algunos microbios se propagan fácil y rápidamente con el contacto. Acuda a la consulta de control con gonzalez médico según las indicaciones: Usted puede ser referido a un especialista en garganta, oído y nariz. Anote vinayak preguntas para que se acuerde de hacerlas finn vinayak visitas. © Copyright [facility] 2023 Information is for End User's use only and may not be sold, redistributed or otherwise used for commercial purposes. Esta información es sólo para uso en educación.  Gonzalez intención no es darle un consejo [employer] enfermedades o tratamientos. Colsulte con gonzalez Hutchinson Bustamante farmacéutico antes de seguir cualquier régimen médico para saber si es seguro y efectivo para usted. Líquido en el oído (otitis media serosa)   LO QUE NECESITA SABER:   La OMS es líquido atrapado en el centro del oído, detrás del tímpano. Esta afección suele desarrollarse sin signos ni síntomas de radha infección de oído. La otitis media serosa puede ser causada por radha infección respiratoria superior o alergias. Es más común en el otoño y principios de la primavera. INSTRUCCIONES SOBRE EL CHLOE HOSPITALARIA:   Llame a gonzalez médico si:  Usted presenta dolor intenso en el oído. La pare exterior del oído está arline o inflamada. Tiene líquido saliendo del oído. Usted tiene preguntas o inquietudes acerca de gonzalez condición o cuidado. Cómo mantenerse saludable:  [employer] las jaycob con frecuencia finn el día. Utilice agua y [place]. [place] las jaycob enjabonadas, [employer] dedos, finn al menos 20 segundos. Enjuáguese con agua corriente caliente. [place] las jaycob con radha toalla limpia o radha toalla de papel. Puede usar un desinfectante para jaycob que contenga alcohol, si no hay agua y jabón disponibles. Enseñe a los niños a lavarse las jaycob y a usar el desinfectante de [address] Evite estar con personas enfermas. Algunos microbios se propagan fácil y rápidamente con el contacto. Acuda a la consulta de control con gonzalez médico según las indicaciones: Anote vinayak preguntas para que se acuerde de hacerlas finn vinayak visitas. © Copyright [employer] 2023 Information is for End User's use only and may not be sold, redistributed or otherwise used for commercial purposes. Esta información es sólo para uso en educación. [place] intención no es darle un consejo médico sobre enfermedades o tratamientos. Colsulte con gonzalez Edmond Bustamante farmacéutico antes de seguir cualquier régimen médico para saber si es seguro y efectivo para usted.

## 2023-11-14 NOTE — PROGRESS NOTES
[facility]        NAME: Elwin Councilman is a 75 y.o. female  : 1950    MRN: [id number]  DATE: 2023  TIME: 12:14 PM    Assessment and Plan   Acute pansinusitis, recurrence not specified [J01.40]  1. Acute pansinusitis, recurrence not specified  amoxicillin-clavulanate (AUGMENTIN) 875-125 mg per tablet      2. Acute effusion of left ear          3 days of sinus pressure, pain, tenderness, left ear ache. On exam-tenderness in the maxillary and frontal sinuses. Ear effusion present on the left without any perforation or erythema. Will start on Augmentin x7 days. Given advice for at home remedies. Advised to follow-up with family doctor if no improvement. Advised to go to the emergency room if symptoms worsen. Patient Instructions     Take prescribed medication as instructed. Take with food avoid upset stomach. Eat yogurt with active and live culture. Tylenol or ibuprofen for pain/fever. Follow instructions on labeling for dosing and frequency. Do not take ibuprofen on an empty stomach. May try nasal saline rinses and previously prescribed Flonase twice daily. If no improvement, follow-up with your family doctor. Go to the emergency room if symptoms worsen. Follow up with PCP in 3-5 days. Proceed to  ER if symptoms worsen. Chief Complaint     Chief Complaint   Patient presents with    Nasal Congestion     , Lisbet Almanzar ([id number])  Left earache  Started 3 days ago  OTC tylenol        Earache         History of Present Illness       65-year-old female presents to the clinic with 3 days of sinus pressure, pain, left ear ache. Denies any sick contacts. She has been taking Tylenol and using her prescribed nasal spray. She denies drainage from the left ear. Denies any sick contacts. Denies any fever, chills, chest pain, shortness of breath, sore throat. Earache   Pertinent negatives include no ear discharge or sore throat.        Review of Systems   Review of Systems   Constitutional: Negative. HENT:  Positive for congestion, ear pain (left), sinus pressure and sinus pain. Negative for ear discharge and sore throat. Eyes: Negative. Respiratory: Negative. Cardiovascular: Negative. Gastrointestinal: Negative. Musculoskeletal: Negative. Neurological: Negative.           Current Medications       Current Outpatient Medications:     albuterol (2.5 mg/3 mL) 0.083 % nebulizer solution, , Disp: , Rfl:     albuterol (PROVENTIL HFA,VENTOLIN HFA) 90 mcg/act inhaler, Inhale 2 puffs every 6 (six) hours as needed, Disp: , Rfl:     ALPRAZolam (XANAX) 1 mg tablet, , Disp: , Rfl:     amLODIPine (NORVASC) 5 mg tablet, , Disp: , Rfl:     amoxicillin-clavulanate (AUGMENTIN) 875-125 mg per tablet, Take 1 tablet by mouth every 12 (twelve) hours for 7 days, Disp: 14 tablet, Rfl: 0    Cholecalciferol (Vitamin D) 50 MCG (2000 UT) tablet, , Disp: , Rfl:     fluticasone (FLONASE) 50 mcg/act nasal spray, 2 sprays into each nostril daily, Disp: 11.1 mL, Rfl: 0    Fluticasone-Salmeterol (Advair) 250-50 mcg/dose inhaler, INHALE 1 DOSE BY MOUTH TWICE DAILY, Disp: , Rfl:     ibuprofen (MOTRIN) 600 mg tablet, TAKE 1 TABLET BY MOUTH EVERY 8 HOURS AS NEEDED FOR MILD PAIN (PAIN SCORE 1-3), Disp: , Rfl:     montelukast (SINGULAIR) 10 mg tablet, Take 10 mg by mouth daily, Disp: , Rfl:     omeprazole (PriLOSEC) 40 MG capsule, Take 40 mg by mouth daily, Disp: , Rfl:     propranolol (INDERAL) 10 mg tablet, Take 10 mg by mouth daily as needed, Disp: , Rfl:     risedronate (ACTONEL) 35 mg tablet, , Disp: , Rfl:     sertraline (ZOLOFT) 100 mg tablet, , Disp: , Rfl:     benzonatate (TESSALON) 200 MG capsule, Take 1 capsule (200 mg total) by mouth 3 (three) times a day as needed for cough (Patient not taking: Reported on 11/14/2023), Disp: 30 capsule, Rfl: 0    traZODone (DESYREL) 100 mg tablet, , Disp: , Rfl:     Current Allergies     Allergies as of 11/14/2023 - Reviewed 11/14/2023 Allergen Reaction Noted    Naproxen Rash and Other (See Comments) 10/24/2011    Naproxen Rash 2023            The following portions of the patient's history were reviewed and updated as appropriate: allergies, current medications, past family history, past medical history, past social history, past surgical history and problem list.     Past Medical History:   Diagnosis Date    Breast cancer ([address])     Depression     Hypertension        Past Surgical History:   Procedure Laterality Date     SECTION      KNEE SURGERY Bilateral     MASTECTOMY Right     SALIVARY GLAND SURGERY         History reviewed. No pertinent family history. Medications have been verified. Objective   /60   Pulse 83   Temp 98 °F (36.7 °C)   Resp 17   SpO2 98%        Physical Exam     Physical Exam  Constitutional:       General: She is not in acute distress. Appearance: Normal appearance. She is not ill-appearing or diaphoretic. HENT:      Head: Normocephalic and atraumatic. Right Ear: Tympanic membrane, ear canal and external ear normal.      Left Ear: Ear canal and external ear normal. No drainage, swelling or tenderness. A middle ear effusion is present. There is no impacted cerumen. No mastoid tenderness. Tympanic membrane is bulging. Tympanic membrane is not perforated or erythematous. Nose: Congestion present. No signs of injury or nasal tenderness. Right Sinus: Maxillary sinus tenderness and frontal sinus tenderness present. Left Sinus: Maxillary sinus tenderness and frontal sinus tenderness present. Mouth/Throat:      Mouth: Mucous membranes are moist.      Pharynx: Oropharynx is clear. No oropharyngeal exudate or posterior oropharyngeal erythema. Eyes:      Extraocular Movements: Extraocular movements intact. Conjunctiva/sclera: Conjunctivae normal.      Pupils: Pupils are equal, round, and reactive to light.    Cardiovascular:      Rate and Rhythm: Normal rate and regular rhythm. Pulses: Normal pulses. Heart sounds: Normal heart sounds. Pulmonary:      Effort: Pulmonary effort is normal. No respiratory distress. Breath sounds: Normal breath sounds. No stridor. No wheezing, rhonchi or rales. Chest:      Chest wall: No tenderness. Musculoskeletal:      Cervical back: Normal range of motion and neck supple. Lymphadenopathy:      Cervical: No cervical adenopathy. Skin:     General: Skin is warm and dry. Capillary Refill: Capillary refill takes less than 2 seconds. Findings: No rash. Neurological:      General: No focal deficit present. Mental Status: She is alert and oriented to person, place, and time. Mental status is at baseline.    Psychiatric:         Mood and Affect: Mood normal.         Behavior: Behavior normal.

## 2024-07-03 ENCOUNTER — OFFICE VISIT (OUTPATIENT)
Dept: URGENT CARE | Facility: CLINIC | Age: 74
End: 2024-07-03
Payer: COMMERCIAL

## 2024-07-03 VITALS
SYSTOLIC BLOOD PRESSURE: 120 MMHG | DIASTOLIC BLOOD PRESSURE: 76 MMHG | TEMPERATURE: 98.6 F | RESPIRATION RATE: 18 BRPM | HEART RATE: 98 BPM | OXYGEN SATURATION: 97 %

## 2024-07-03 DIAGNOSIS — J01.00 ACUTE MAXILLARY SINUSITIS, RECURRENCE NOT SPECIFIED: Primary | ICD-10-CM

## 2024-07-03 DIAGNOSIS — R05.1 ACUTE COUGH: ICD-10-CM

## 2024-07-03 PROCEDURE — 99213 OFFICE O/P EST LOW 20 MIN: CPT

## 2024-07-03 PROCEDURE — S9088 SERVICES PROVIDED IN URGENT: HCPCS

## 2024-07-03 RX ORDER — PREDNISONE 20 MG/1
40 TABLET ORAL DAILY
Qty: 10 TABLET | Refills: 0 | Status: SHIPPED | OUTPATIENT
Start: 2024-07-03 | End: 2024-07-08

## 2024-07-03 RX ORDER — DEXTROMETHORPHAN HYDROBROMIDE AND PROMETHAZINE HYDROCHLORIDE 15; 6.25 MG/5ML; MG/5ML
5 SYRUP ORAL 4 TIMES DAILY PRN
Qty: 118 ML | Refills: 0 | Status: SHIPPED | OUTPATIENT
Start: 2024-07-03

## 2024-07-03 RX ORDER — AMOXICILLIN AND CLAVULANATE POTASSIUM 875; 125 MG/1; MG/1
1 TABLET, FILM COATED ORAL EVERY 12 HOURS SCHEDULED
Qty: 14 TABLET | Refills: 0 | Status: SHIPPED | OUTPATIENT
Start: 2024-07-03 | End: 2024-07-10

## 2024-07-03 NOTE — PATIENT INSTRUCTIONS
"Take prescribed medication as instructed.  Eat yogurt with live and active cultures and/or take a probiotic at least 3 hours before or after antibiotic dose. Monitor stool for diarrhea and/or blood. If this occurs, contact primary care doctor ASAP.    Tylenol for pain or fever.   Nasal saline rinses/Flonase for congestion.  Warm tea with honey or teaspoon of honey may help with coughing irritation.  Make sure to stay well hydrated and rest.   Follow up with PCP in 3-5 days.  Proceed to  ER if symptoms worsen.    If tests are performed, our office will contact you with results only if changes need to made to the care plan discussed with you at the visit. You can [facility] your full [facility] on [facility]  Patient Education     Sinusitis en adultos   Conceptos Básicos   Redactado por los médicos y editores de UpToDate   ¿Qué es la sinusitis? -- La sinusitis es un padecimiento que puede provocar congestión nasal, dolor en la adan y secreciones nasales o \"moco\".  Los senos paranasales son cavidades que se encuentran en los huesos de la adan (figura 1). Poseen un recubrimiento cox que normalmente produce radha cantidad pequeña de moco. Cuando jaelyn recubrimiento se irrita o se infecta, se inflama y produce más moco. Shafer produce síntomas.  La sinusitis por lo general ocurre después de que radha persona se resfría. Los gérmenes que causan el resfriado también pueden infectar los senos paranasales. A veces, otros gérmenes pueden ser la causa de la infección. A menudo, la persona siente que [place]utherland resfriado mejora, yovani luego desarrolla sinusitis y empieza a sentirse enferma otra vez.  ¿Cuáles son los síntomas de la sinusitis? -- Los síntomas más comunes de la sinusitis incluyen:   Nariz tapada o bloqueada   Secreciones nasales espesas de color rivera, terry o amarillo   Dolor en los dientes   Dolor o presión en la adan. Shafer a menudo empeora cuando la persona se inclina hacia adelante.  Las personas con sinusitis también " pueden tener otros síntomas, por ejemplo:   Fiebre   Tos   Dificultad para oler   Presión en el oído o sensación de oído tapado   Dolor de jun   Mal aliento   Sensación de cansancio  La mayoría de las veces, los síntomas comienzan a mejorar a los 7 a 10 odilia.  ¿Lolis consultar a un médico o enfermero? -- Consulte a un médico o enfermero si vinayak síntomas hansen más de 10 días o si los síntomas mejoran al principio yovani después empeoran.  Muy pocas veces, la sinusitis puede causar problemas graves. Consulte a [place] médico o enfermero de inmediato (no espere 10 días) si tiene:   Fiebre superior a 102 °F (38.9 °C)   Dolor repentino e intenso en la adan y la jun   Dificultad para ajay o visión doble   Dificultad para pensar con claridad   Inflamación o enrojecimiento alrededor de liza o ambos ojos   Rigidez en el joellen  ¿Hay algo que pueda hacer por mi cuenta para sentirme mejor? -- Sí. Para aliviar los síntomas, puede:   Rosa radha medicina de venta sin receta para disminuir el dolor.   Lavarse la nariz y los senos paranasales con agua salada varias veces por día - Pregúntele a [place] médico o enfermero cuál es la mejor forma de hacerlo.   Beber abundantes líquidos - Mantenerse hidratado podría ayudar a disolver el moco y permitir que salga más fácilmente.  El médico podría recomendarle también un spray nasal con esteroides para reducir la inflamación de s[place] nariz, en especial si tiene alergias. Hable con [place] médico si está pensando en usar un spray con esteroides.  ¿Cómo se trata la sinusitis? -- La mayoría de las veces, la sinusitis no necesita tratarse con antibióticos. [place] se debe a que la mayoría de las sinusitis aparecen a causa de virus, no bacterias, y los antibióticos no sirven para combatir virus. De hecho, incluso la sinusitis ocasionada por radha bacteria normalmente mejora por sí vu sin antibióticos.  Sin embargo, algunas personas sí los necesitan. Si vinayak síntomas no mejoran después de 10 días, pregúntele a s[place]land  "médico si debe poornima antibióticos. Es posible que le recomiende esperar radha semana más para ajay si los síntomas mejoran, yovani si tiene síntomas felipe fiebre o mucho dolor, preston vez le recete antibióticos. Si recibe antibióticos, siga todas las indicaciones de [place] médico para tomarlos.  ¿Qué pasa si mis síntomas no mejoran? -- Si vinayak síntomas no mejoran, consulte a [place] médico o enfermero. Es posible que le indique pruebas para descubrir por qué tiene síntomas todavía. Por ejemplo:   Tomografía u otros estudios de imagen - Estos estudios crean imágenes del interior del cuerpo.   Radha prueba para mirar dentro de los senos paranasales - Para esta prueba, el médico inserta dentro de la nariz un tubo cox con radha cámara en el extremo para ajay los senos paranasales.  Algunas personas tienen infecciones frecuentes en los senos paranasales o tienen síntomas que hansen al menos 3 meses. En esos casos puede karmen un tipo distinto de sinusitis llamada \"sinusitis crónica\", la cual puede tener distintas causas; por ejemplo, algunas personas tienen unos nódulos llamados \"pólipos\" dentro de la nariz o en los senos paranasales, y otras presentan síntomas debido a que tienen alergias.  La sinusitis crónica puede tratarse de diferentes maneras. Si tiene sinusitis crónica, hable con [place] médico sobre cuáles son los tratamientos adecuados en s[place]land gonzalo.  Todos los artículos se actualizan a medida que se descubre nueva evidencia y culmina nuestro proceso de evaluación por homólogos   Jaelyn artículo se recuperó de UpToDate el: Feb 28, 2024.  Artículo 72337 Versión [id number]  Release: 32.2.4 - C32.58  © 2024 UpToDate, Inc. Todos los derechos reservados.  figura 1: Senos paranasales     Los senos paranasales son cavidades que se encuentran en los huesos de la adan. En jaelyn dibujo se observa la ubicación de los senos paranasales, en vinayak vistas lateral y frontal. Hay 4 pares de senos paranasales, llamados así por los huesos que los rodean: " esfenoides, frontales, etmoides y maxilares.  Gráfico [id number] Versión 3.0  Exención de responsabilidad y uso de la información del consumidor   Descargo de responsabilidad: esta información generalizada es un resumen limitado de información sobre el diagnóstico, el tratamiento y/o los medicamentos. No pretende ser exhaustiva y se debe utilizar felipe herramienta para ayudar al usuario a comprender y/o evaluar las posibles opciones de diagnóstico y tratamiento. No incluye toda la información sobre afecciones, tratamientos, medicamentos, efectos secundarios o riesgos puedan ser aplicables a un paciente específico. No tiene el propósito de servir felipe recomendación médica ni de sustituir la recomendación médica, el diagnóstico o el tratamiento de un profesional de atención médica que se base en el examen y la evaluación de jaelyn profesional de la tamy respecto a las circunstancias específicas y únicas del paciente. Los pacientes deben hablar con un profesional de atención médica para obtener información completa sobre [place] tamy, cuestiones médicas y opciones de tratamiento, incluidos los riesgos o los beneficios relacionados con el uso de medicamentos. Esta información no certifica que los tratamientos o medicamentos river seguros, eficaces o estén aprobados para tratar a un paciente específico. [employer] y vinayak afiliados renuncian a cualquier garantía o responsabilidad relacionada con esta información o el uso de la misma.El uso de esta información está sujeto a las Condiciones de uso, disponibles en https://www.[employer].com/en/know/clinical-effectiveness-terms. 2024© UpToDate, Inc. y vinayak afiliados y/o licenciantes. Todos los derechos reservados.  Copyright   © 2024 UpToDate, Inc. Todos los derechos reservados.  Patient Education     Tos en adultos   Conceptos Básicos   Redactado por los médicos y editores de UpToDate   ¿Qué es la tos? -- La tos es un reflejo importante que ayuda a limpiar las vías respiratorias  del cuerpo. Las vías respiratorias incluyen la tráquea y los bronquios, que son los tubos que transportan el aire en los pulmones. Toser tiarra que las personas inhalen y dejen ingresar cosas en las vías respiratorias y en los pulmones, lo que podría causar problemas (figura 1).  Es normal que las personas tosan de vez en cuando, yovani en algunos casos, la tos es un síntoma de radha enfermedad o padecimiento.  Un tipo de tos se denomina tos “seca” porque no contiene moco (flema); otro tipo de tos se denomina “húmeda” o “productiva” porque contiene moco. En algunos casos, la tos es leve y no provoca problemas graves, yovani en otros es grave y puede causar problemas para respirar.  ¿Qué causa la tos? -- En los adultos, estas son algunas de las causas más comunes de la tos:   Infecciones virales - Algunas de ellas son el resfriado común, la gripe y el COVID-19. Generalmente, la tos ocasionada por radha infección viral solo dura radha semana o dos, yovani a veces puede durar más.   Fumar cigarrillos comunes o electrónicos   Goteo posnasal - El goteo posnasal es cuando el moco de la nariz gotea o fluye por la parte trasera de la garganta y se puede producir cuando la persona tiene:   Un resfriado   Alergias   Radha infección de los senos paranasales   Padecimientos en los pulmones - Algunos problemas pulmonares, felipe el asma y la enfermedad pulmonar obstructiva crónica (EPOC), pueden causar dificultad para respirar. La EPOC suele aparecer a causa de fumar.   Reflujo - El reflujo se produce cuando el ácido que normalmente se encuentra en el estómago empieza a subir por el esófago (el tubo que lleva la comida de la boca al estómago).   Un efecto secundario de medicinas para la presión arterial llamadas “inhibidores de la ECA”  ¿Es necesario que me realice pruebas? -- Hong vez. Si consulta a un médico con respecto a [place] tos, conversará con usted y le hará un examen. Según vinayak síntomas y otros factores, es posible que determine que  necesita pruebas. Pueden ser, entre otros:   Extracción de radha muestra del interior de la nariz - Se puede realizar radha prueba con la muestra para detectar el virus que causa COVID-19.   Radha radiografía de tórax   Pruebas de respiración - Las pruebas de respiración consisten en respirar james en un tubo. Estas pruebas muestran cómo están funcionando los pulmones.   Pruebas de alergias en la piel para determinar si es alérgico a algo - En esta prueba, el médico le coloca en la piel radha gota de la sustancia a la que podría ser alérgico y le hace un pequeño pinchazo. Luego, observa s[place] piel para ajay si se enrojece o se vuelve irregular.   Tomografía de [place] pecho o senos paranasales - La tomografía es un estudio de imagen que crea imágenes del interior del cuerpo.   Pruebas de laboratorio de radha muestra del moco que tosió   Observar el interior de la nariz, los senos paranasales, las vías respiratorias o los pulmones con un dispositivo   Pruebas para determinar si tiene reflujo - Por lo general esto se hace con un tubo cox que se introduce en la boca y se desplaza hasta el esófago.  ¿Cómo puedo cuidarme en casa?    Si la tos se debe a un resfriado, puede usar un humidificador con condensación fría en s[place]land habitación.   Chupe pastillas para la tos o caramelos duros.   Susan líquidos calientes, felipe el té, para aliviar la garganta.   Evite fumar y los lugares donde haya gente fumando.   Si tiene alergias, evite las cosas a las que es alérgico. Algunas de ellas podrían ser el polen, el polvo, los animales o el moho.   Si tiene tos con moco, pruebe radha medicina de venta sin receta para el resfriado y la tos. Estas medicinas pueden disolver el moco y, a veces, disminuir el impulso de toser.   Si tiene reflujo, [place] médico o enfermero le hablará sobre cómo disminuir los síntomas.  ¿Cómo se trata la tos? -- El tratamiento depende de la causa de la tos. Por ejemplo:   Algunas infecciones se tratan con medicinas antibióticas. Si  "la causa de la infección es radha bacteria, los médicos pueden tratarla con antibióticos. Si la causa de la infección es un virus (felipe el del resfriado común), los antibióticos no ayudan. Para algunas infecciones virales, felipe la gripe o el COVID-19, podría karmen otras medicinas que river de ayuda.   El goteo posnasal se trata con diferentes tipos de medicinas que pueden venir en píldoras o spray para la nariz.   El asma y la EPOC se suelen tratar con medicinas que se inhalan, llamadas \"medicinas inhaladas\".   El reflujo se puede tratar con medicinas para disminuir o bloquear el ácido del estómago.   Si tiene tos felipe efecto secundario de un inhibidor de la ECA, [place] médico puede cambiarle la medicina.  Si la causa de la tos no está garcia, s[place] médico podría recetarle medicinas para ayudarlo a aliviar la tos. Sin embargo, esas medicinas tienen efectos secundarios, por lo cual los médicos suelen recomendarlas solamente en gonzalo de que las demás opciones no hayan funcionado.  ¿Cuándo rashmi llamar al médico? -- Llame a s[place] médico o enfermero si:   Tiene dificultad para respirar o tiene radha respiración ruidosa (sibilancia).   Tiene fiebre o dolor en el pecho.   Tose raf o moco amarillento o verdoso.   Tose tanto que lo hace vomitar.   [place] tos empeora o dura más de 14 días.   Tiene tos y torres bajado de peso involuntariamente.  Todos los artículos se actualizan a medida que se descubre nueva evidencia y culmina nuestro proceso de evaluación por homólogos   Ashley artículo se recuperó de UpToDate el: Feb 26, 2024.  Artículo 23495 Versión [id number]  Release: 32.2.4 - C32.56  © 2024 [employer] Inc. Todos los derechos reservados.  figura 1: Pulmones normales     Los pulmones se encuentran en el pecho, en el interior de la caja torácica. Están recubiertos con radha wgen membrana llamada “pleura”. La tráquea se divide en dos vías respiratorias más pequeñas llamadas \"bronquios\" bell y derecho. El espacio entre los pulmones se llama " “mediastino”. Los ganglios linfáticos están ubicados dentro de los pulmones y el mediastino, y alrededor de estos.  Gráfico 32160 Versión 14.0  Exención de responsabilidad y uso de la información del consumidor   Descargo de responsabilidad: esta información generalizada es un resumen limitado de información sobre el diagnóstico, el tratamiento y/o los medicamentos. No pretende ser exhaustiva y se debe utilizar felipe herramienta para ayudar al usuario a comprender y/o evaluar las posibles opciones de diagnóstico y tratamiento. No incluye toda la información sobre afecciones, tratamientos, medicamentos, efectos secundarios o riesgos puedan ser aplicables a un paciente específico. No tiene el propósito de servir felipe recomendación médica ni de sustituir la recomendación médica, el diagnóstico o el tratamiento de un profesional de atención médica que se base en el examen y la evaluación de jaelyn profesional de la tamy respecto a las circunstancias específicas y únicas del paciente. Los pacientes deben hablar con un profesional de atención médica para obtener información completa sobre [place] tamy, cuestiones médicas y opciones de tratamiento, incluidos los riesgos o los beneficios relacionados con el uso de medicamentos. Esta información no certifica que los tratamientos o medicamentos river seguros, eficaces o estén aprobados para tratar a un paciente específico. UpToDate, Inc. y vinayak afiliados renuncian a cualquier garantía o responsabilidad relacionada con esta información o el uso de la misma.El uso de esta información está sujeto a las Condiciones de uso, disponibles en https://www.[employer].com/en/know/clinical-effectiveness-terms. 2024© [employer] y vinayak afiliados y/o licenciantes. Todos los derechos reservados.  Copyright   © 2024 UpToDate, Inc. Todos los derechos reservados.

## 2024-07-03 NOTE — PROGRESS NOTES
[facility]        NAME: Beth Hutchins is a 73 y.o. female  : 1950    MRN: [id number]  DATE: July 3, 2024  TIME: 12:26 PM    Assessment and Plan   Acute maxillary sinusitis, recurrence not specified [J01.00]  1. Acute maxillary sinusitis, recurrence not specified  amoxicillin-clavulanate (AUGMENTIN) 875-125 mg per tablet      2. Acute cough  promethazine-dextromethorphan (PHENERGAN-DM) 6.25-15 mg/5 mL oral syrup    predniSONE 20 mg tablet        1 week of upper respiratory congestion, sinus pressure, cough.  Does have history of COPD with asthma.  Lungs were clear to auscultation without wheezing rales or rhonchi.  Pulse ox 97% on room air without cardia.  No acute distress will give trial of Augmentin, Phenergan DM to use as needed for cough, and prednisone.  Advised to follow-up with family doctor.  Advised to go to the ER if any symptoms worsen.    Patient Instructions     Take prescribed medication as instructed.  Eat yogurt with live and active cultures and/or take a probiotic at least 3 hours before or after antibiotic dose. Monitor stool for diarrhea and/or blood. If this occurs, contact primary care doctor ASAP.    Tylenol for pain or fever.   Nasal saline rinses/Flonase for congestion.  Warm tea with honey or teaspoon of honey may help with coughing irritation.  Make sure to stay well hydrated and rest.   Follow up with PCP in 3-5 days.  Proceed to  ER if symptoms worsen.    If tests are performed, our office will contact you with results only if changes need to made to the care plan discussed with you at the visit. [facility]    Chief Complaint     Chief Complaint   Patient presents with    Cough    Headache     With nasal congestion onset 1 week ago         History of Present Illness       73-year-old female presents to the clinic with 1 week of upper respiratory congestion, sinus pressure/headache, ear pain, cough.  Denies sick  contacts.  Taking Allegra over-the-counter.  Does have history of COPD with asthma.  Denies fever, chills, chest pain, shortness of breath abdominal pain, nausea, vomiting, diarrhea.        Review of Systems   Review of Systems   Constitutional: Negative.  Negative for chills and fever.   HENT:  Positive for congestion, ear pain and sinus pressure. Negative for ear discharge and sore throat.    Eyes: Negative.    Respiratory:  Positive for cough. Negative for shortness of breath and wheezing.    Cardiovascular: Negative.    Skin: Negative.    Neurological:  Positive for headaches. Negative for dizziness, syncope, speech difficulty, weakness, light-headedness and numbness.         Current Medications       Current Outpatient Medications:     amoxicillin-clavulanate (AUGMENTIN) 875-125 mg per tablet, Take 1 tablet by mouth every 12 (twelve) hours for 7 days, Disp: 14 tablet, Rfl: 0    predniSONE 20 mg tablet, Take 2 tablets (40 mg total) by mouth daily for 5 days, Disp: 10 tablet, Rfl: 0    promethazine-dextromethorphan (PHENERGAN-DM) 6.25-15 mg/5 mL oral syrup, Take 5 mL by mouth 4 (four) times a day as needed for cough, Disp: 118 mL, Rfl: 0    albuterol (2.5 mg/3 mL) 0.083 % nebulizer solution, , Disp: , Rfl:     albuterol (PROVENTIL HFA,VENTOLIN HFA) 90 mcg/act inhaler, Inhale 2 puffs every 6 (six) hours as needed, Disp: , Rfl:     ALPRAZolam (XANAX) 1 mg tablet, , Disp: , Rfl:     amLODIPine (NORVASC) 5 mg tablet, , Disp: , Rfl:     benzonatate (TESSALON) 200 MG capsule, Take 1 capsule (200 mg total) by mouth 3 (three) times a day as needed for cough (Patient not taking: Reported on 11/14/2023), Disp: 30 capsule, Rfl: 0    Cholecalciferol (Vitamin D) 50 MCG (2000 UT) tablet, , Disp: , Rfl:     fluticasone (FLONASE) 50 mcg/act nasal spray, 2 sprays into each nostril daily, Disp: 11.1 mL, Rfl: 0    Fluticasone-Salmeterol (Advair) 250-50 mcg/dose inhaler, INHALE 1 DOSE BY MOUTH TWICE DAILY, Disp: , Rfl:     ibuprofen  (MOTRIN) 600 mg tablet, TAKE 1 TABLET BY MOUTH EVERY 8 HOURS AS NEEDED FOR MILD PAIN (PAIN SCORE 1-3), Disp: , Rfl:     montelukast (SINGULAIR) 10 mg tablet, Take 10 mg by mouth daily, Disp: , Rfl:     omeprazole (PriLOSEC) 40 MG capsule, Take 40 mg by mouth daily, Disp: , Rfl:     propranolol (INDERAL) 10 mg tablet, Take 10 mg by mouth daily as needed, Disp: , Rfl:     risedronate (ACTONEL) 35 mg tablet, , Disp: , Rfl:     sertraline (ZOLOFT) 100 mg tablet, , Disp: , Rfl:     traZODone (DESYREL) 100 mg tablet, , Disp: , Rfl:     Current Allergies     Allergies as of 2024 - Reviewed 2024   Allergen Reaction Noted    Naproxen Rash and Other (See Comments) 10/24/2011    Naproxen Rash 2023            The following portions of the patient's history were reviewed and updated as appropriate: allergies, current medications, past family history, past medical history, past social history, past surgical history and problem list.     Past Medical History:   Diagnosis Date    Breast cancer (HCC)     Depression     Hypertension        Past Surgical History:   Procedure Laterality Date     SECTION      KNEE SURGERY Bilateral     MASTECTOMY Right     SALIVARY GLAND SURGERY         No family history on file.      Medications have been verified.        Objective   /76   Pulse 98   Temp 98.6 °F (37 °C)   Resp 18   SpO2 97%        Physical Exam     Physical Exam  Constitutional:       General: She is not in acute distress.     Appearance: Normal appearance. She is not ill-appearing, toxic-appearing or diaphoretic.   HENT:      Head: Normocephalic and atraumatic.      Right Ear: Tympanic membrane, ear canal and external ear normal.      Left Ear: Tympanic membrane, ear canal and external ear normal.      Nose: Congestion present.      Right Sinus: Maxillary sinus tenderness present.      Left Sinus: Maxillary sinus tenderness present.      Mouth/Throat:      Mouth: Mucous membranes are moist.       Pharynx: Oropharynx is clear. No oropharyngeal exudate or posterior oropharyngeal erythema.   Eyes:      Extraocular Movements: Extraocular movements intact.      Conjunctiva/sclera: Conjunctivae normal.      Pupils: Pupils are equal, round, and reactive to light.   Cardiovascular:      Rate and Rhythm: Normal rate and regular rhythm.      Pulses: Normal pulses.      Heart sounds: Normal heart sounds.   Pulmonary:      Effort: Pulmonary effort is normal. No tachypnea, bradypnea, accessory muscle usage, prolonged expiration, respiratory distress or retractions.      Breath sounds: Normal breath sounds and air entry. No stridor, decreased air movement or transmitted upper airway sounds. No decreased breath sounds, wheezing, rhonchi or rales.   Chest:      Chest wall: No tenderness.   Musculoskeletal:      Cervical back: Normal range of motion. No tenderness.   Lymphadenopathy:      Cervical: No cervical adenopathy.   Skin:     General: Skin is warm and dry.      Capillary Refill: Capillary refill takes less than 2 seconds.      Coloration: Skin is not pale.      Findings: No erythema or rash.   Neurological:      General: No focal deficit present.      Mental Status: She is alert and oriented to person, place, and time.   Psychiatric:         Mood and Affect: Mood normal.

## 2024-09-26 ENCOUNTER — OFFICE VISIT (OUTPATIENT)
Dept: URGENT CARE | Facility: CLINIC | Age: 74
End: 2024-09-26
Payer: COMMERCIAL

## 2024-09-26 VITALS
TEMPERATURE: 98.9 F | BODY MASS INDEX: 43.79 KG/M2 | WEIGHT: 238 LBS | HEART RATE: 85 BPM | OXYGEN SATURATION: 98 % | RESPIRATION RATE: 20 BRPM | HEIGHT: 62 IN

## 2024-09-26 DIAGNOSIS — J01.90 ACUTE SINUSITIS, RECURRENCE NOT SPECIFIED, UNSPECIFIED LOCATION: ICD-10-CM

## 2024-09-26 DIAGNOSIS — R05.1 ACUTE COUGH: Primary | ICD-10-CM

## 2024-09-26 PROCEDURE — S9088 SERVICES PROVIDED IN URGENT: HCPCS

## 2024-09-26 PROCEDURE — 99213 OFFICE O/P EST LOW 20 MIN: CPT

## 2024-09-26 RX ORDER — PREDNISONE 20 MG/1
40 TABLET ORAL DAILY
Qty: 10 TABLET | Refills: 0 | Status: SHIPPED | OUTPATIENT
Start: 2024-09-26 | End: 2024-10-01

## 2024-09-26 RX ORDER — FLUTICASONE PROPIONATE 50 MCG
2 SPRAY, SUSPENSION (ML) NASAL DAILY
Qty: 11.1 ML | Refills: 0 | Status: SHIPPED | OUTPATIENT
Start: 2024-09-26

## 2024-09-26 NOTE — PROGRESS NOTES
[facility]        NAME: Beth Hutchins is a 74 y.o. female  : 1950    MRN: [id number]  DATE: 2024  TIME: 10:40 AM    Assessment and Plan   Acute cough [R05.1]  1. Acute cough  predniSONE 20 mg tablet      2. Acute sinusitis, recurrence not specified, unspecified location  amoxicillin-clavulanate (AUGMENTIN) 875-125 mg per tablet    predniSONE 20 mg tablet    fluticasone (FLONASE) 50 mcg/act nasal spray        5 days of upper respiratory congestion, sinus pressure, cough.  Lungs clear to auscultation without wheezing rales or rhonchi.  Starting on Augmentin, prednisone and Flonase which has worked well for patient in the past.  Recommended family doctor follow-up.  Advised to go to the ER if any symptoms worsen.    Patient Instructions     Take prescribed medication as instructed.  Eat yogurt with live and active cultures and/or take a probiotic at least 3 hours before or after antibiotic dose. Monitor stool for diarrhea and/or blood. If this occurs, contact primary care doctor ASAP.  Tylenol for pain or fever.  Avoid NSAIDs such as ibuprofen while taking prednisone.  Nasal saline rinses.  Follow-up with your family doctor if no improvement.  Follow up with PCP in 3-5 days.  Proceed to  ER if symptoms worsen.    If tests are performed, our office will contact you with results only if changes need to made to the care plan discussed with you at the visit. You can review your full results on [facility].    Chief Complaint     Chief Complaint   Patient presents with    Cold Like Symptoms     Sinus congestion and pressure and cough          History of Present Illness       74-year-old female here with 5 days of upper respiratory congestion, sinus pressure, cough.  Denies sick contacts.  Taking Allegra without improvement.  Denies any fever, chills, earache, sore throat, chest pain, shortness of breath.        Review of Systems   Review of Systems   Constitutional: Negative.     HENT:  Positive for congestion, sinus pressure and sinus pain. Negative for ear discharge, ear pain and sore throat.    Eyes: Negative.    Respiratory:  Positive for cough. Negative for shortness of breath and wheezing.    Cardiovascular: Negative.    Musculoskeletal: Negative.    Neurological: Negative.          Current Medications       Current Outpatient Medications:     albuterol (2.5 mg/3 mL) 0.083 % nebulizer solution, , Disp: , Rfl:     albuterol (PROVENTIL HFA,VENTOLIN HFA) 90 mcg/act inhaler, Inhale 2 puffs every 6 (six) hours as needed, Disp: , Rfl:     ALPRAZolam (XANAX) 1 mg tablet, , Disp: , Rfl:     amLODIPine (NORVASC) 5 mg tablet, , Disp: , Rfl:     amoxicillin-clavulanate (AUGMENTIN) 875-125 mg per tablet, Take 1 tablet by mouth every 12 (twelve) hours for 7 days, Disp: 14 tablet, Rfl: 0    Cholecalciferol (Vitamin D) 50 MCG (2000 UT) tablet, , Disp: , Rfl:     fluticasone (FLONASE) 50 mcg/act nasal spray, 2 sprays into each nostril daily, Disp: 11.1 mL, Rfl: 0    fluticasone (FLONASE) 50 mcg/act nasal spray, 2 sprays into each nostril daily, Disp: 11.1 mL, Rfl: 0    Fluticasone-Salmeterol (Advair) 250-50 mcg/dose inhaler, INHALE 1 DOSE BY MOUTH TWICE DAILY, Disp: , Rfl:     ibuprofen (MOTRIN) 600 mg tablet, TAKE 1 TABLET BY MOUTH EVERY 8 HOURS AS NEEDED FOR MILD PAIN (PAIN SCORE 1-3), Disp: , Rfl:     montelukast (SINGULAIR) 10 mg tablet, Take 10 mg by mouth daily, Disp: , Rfl:     omeprazole (PriLOSEC) 40 MG capsule, Take 40 mg by mouth daily, Disp: , Rfl:     predniSONE 20 mg tablet, Take 2 tablets (40 mg total) by mouth daily for 5 days, Disp: 10 tablet, Rfl: 0    promethazine-dextromethorphan (PHENERGAN-DM) 6.25-15 mg/5 mL oral syrup, Take 5 mL by mouth 4 (four) times a day as needed for cough, Disp: 118 mL, Rfl: 0    propranolol (INDERAL) 10 mg tablet, Take 10 mg by mouth daily as needed, Disp: , Rfl:     risedronate (ACTONEL) 35 mg tablet, , Disp: , Rfl:     sertraline (ZOLOFT) 100 mg  "tablet, , Disp: , Rfl:     traZODone (DESYREL) 100 mg tablet, , Disp: , Rfl:     benzonatate (TESSALON) 200 MG capsule, Take 1 capsule (200 mg total) by mouth 3 (three) times a day as needed for cough (Patient not taking: Reported on 2024), Disp: 30 capsule, Rfl: 0    Current Allergies     Allergies as of 2024 - Reviewed 2024   Allergen Reaction Noted    Naproxen Rash and Other (See Comments) 10/24/2011    Naproxen Rash 2023            The following portions of the patient's history were reviewed and updated as appropriate: allergies, current medications, past family history, past medical history, past social history, past surgical history and problem list.     Past Medical History:   Diagnosis Date    Breast cancer (HCC)     Depression     Hypertension        Past Surgical History:   Procedure Laterality Date     SECTION      KNEE SURGERY Bilateral     MASTECTOMY Right     SALIVARY GLAND SURGERY         No family history on file.      Medications have been verified.        Objective   Pulse 85   Temp 98.9 °F (37.2 °C)   Resp 20   Ht 5' 2\" (1.575 m)   Wt 108 kg (238 lb)   SpO2 98%   BMI 43.53 kg/m²        Physical Exam     Physical Exam  Constitutional:       General: She is not in acute distress.     Appearance: Normal appearance. She is not ill-appearing or diaphoretic.   HENT:      Head: Normocephalic and atraumatic.      Right Ear: Tympanic membrane, ear canal and external ear normal.      Left Ear: Tympanic membrane, ear canal and external ear normal.      Nose: Congestion present.      Mouth/Throat:      Mouth: Mucous membranes are moist.      Pharynx: Oropharynx is clear. No oropharyngeal exudate or posterior oropharyngeal erythema.   Eyes:      Extraocular Movements: Extraocular movements intact.      Conjunctiva/sclera: Conjunctivae normal.      Pupils: Pupils are equal, round, and reactive to light.   Cardiovascular:      Rate and Rhythm: Normal rate and regular rhythm. "      Pulses: Normal pulses.      Heart sounds: Normal heart sounds. No murmur heard.     No friction rub. No gallop.   Pulmonary:      Effort: Pulmonary effort is normal. No respiratory distress.      Breath sounds: Normal breath sounds. No stridor. No wheezing, rhonchi or rales.   Chest:      Chest wall: No tenderness.   Musculoskeletal:      Cervical back: Normal range of motion and neck supple. No tenderness.   Lymphadenopathy:      Cervical: No cervical adenopathy.   Skin:     General: Skin is warm and dry.      Capillary Refill: Capillary refill takes less than 2 seconds.      Findings: No erythema or rash.   Neurological:      General: No focal deficit present.      Mental Status: She is alert and oriented to person, place, and time.   Psychiatric:         Mood and Affect: Mood normal.         Behavior: Behavior normal.

## 2024-09-26 NOTE — PATIENT INSTRUCTIONS
Take prescribed medication as instructed.  Eat yogurt with live and active cultures and/or take a probiotic at least 3 hours before or after antibiotic dose. Monitor stool for diarrhea and/or blood. If this occurs, contact primary care doctor ASAP.  Tylenol for pain or fever.  Avoid NSAIDs such as ibuprofen while taking prednisone.  Nasal saline rinses.  Follow-up with your family doctor if no improvement.  Follow up with PCP in 3-5 days.  Proceed to  ER if symptoms worsen.    If tests are performed, our office will contact you with results only if changes need to made to the care plan discussed with you at the visit. You can review your full results on [facility].  Patient Education     Sinusitis, servicio de emergencias de adultos   ¿Qué cuidados se necesitan en casa?   Llame a [place] médico habitual para comunicarle que estuvo en el servicio de emergencias. Programe radha josee de seguimiento si así se lo pidieron.  Intente aligerar la mucosidad.  Susan muchos líquidos para mantenerse hidratado.  Use un humidificador de aire húmedo para evitar el aire seco.  Aplique gotas nasales dumont o radha solución nasal salina para aliviar la congestión.  Lávese las jaycob con frecuencia. [place] ayudará a mantener saludables a los demás.  No fume o esté en lugares con humo. Norma cosas que podrían causarle problemas respiratorios felipe los gases, la contaminación, el polvo y otros alérgenos comunes.  Puede poornima medicamentos felipe ibuprofeno, naproxeno o paracetamol para ayudarlo a aliviar el dolor.  Call for Emergency Help   Regrese al servicio de emergencias si:   Tiene rigidez en el joellen, especialmente si también tiene fiebre, escalofríos, vómitos o dolor de jun intenso.  Tiene problemas para pensar con claridad.  Tiene problemas para ajay o tiene visión doble.  Tiene hinchazón, enrojecimiento o dolor alrededor de liza o ambos ojos.  Tiene fiebre de 38,9 °C (102 °F) o más, escalofríos con temblor o sudores.  ¿Cuándo rashmi  Recommended observation. llamar al médico?   Tiene malestar estomacal y vómitos.  Tiene más dolor en la adan y la jun.  No mejora en 1 o 2 semanas.  Tiene síntomas nuevos o que empeoran.  Exención de responsabilidad y uso de la información del consumidor   Esta información general es un resumen limitado de la información sobre el diagnóstico, el tratamiento y/o la medicación. No pretende ser exhaustivo y debe utilizarse felipe radha herramienta para ayudar al usuario a comprender y/o evaluar las posibles opciones de diagnóstico y tratamiento. NO incluye toda la información sobre las enfermedades, los tratamientos, los medicamentos, los efectos secundarios o los riesgos que pueden aplicarse a un paciente específico. No tiene por objeto ser un consejo médico ni un sustituto del consejo médico. [place] pretende reemplazar al diagnóstico o el tratamiento proporcionados por un proveedor de atención médica con base en el examen y la evaluación por parte de jaelyn proveedor de las circunstancias específicas y únicas de un paciente. Los pacientes deben hablar con un proveedor de atención médica para obtener información completa sobre [place] tamy, preguntas médicas y opciones de tratamiento, incluidos los riesgos o beneficios relacionados con el uso de medicamentos. Esta información no respalda ningún tratamiento o medicamento felipe seguro, eficaz o aprobado para tratar a un paciente específico. [employer] y vinayak afiliados renuncian a cualquier garantía o responsabilidad relacionada con esta información o con el uso que se lito de esta. El uso de esta información se rige por las Condiciones de uso, disponibles en https://www.woltersPontabauwer.com/en/know/clinical-effectiveness-terms   Copyright   © 2024 [employer] Todos los derechos reservados.

## 2024-11-14 ENCOUNTER — OFFICE VISIT (OUTPATIENT)
Dept: URGENT CARE | Facility: CLINIC | Age: 74
End: 2024-11-14
Payer: COMMERCIAL

## 2024-11-14 ENCOUNTER — APPOINTMENT (OUTPATIENT)
Dept: RADIOLOGY | Facility: CLINIC | Age: 74
End: 2024-11-14
Payer: COMMERCIAL

## 2024-11-14 VITALS
DIASTOLIC BLOOD PRESSURE: 86 MMHG | RESPIRATION RATE: 20 BRPM | HEART RATE: 89 BPM | OXYGEN SATURATION: 95 % | TEMPERATURE: 97.8 F | SYSTOLIC BLOOD PRESSURE: 122 MMHG

## 2024-11-14 DIAGNOSIS — J20.9 ACUTE BRONCHITIS, UNSPECIFIED ORGANISM: Primary | ICD-10-CM

## 2024-11-14 DIAGNOSIS — R05.9 COUGH, UNSPECIFIED TYPE: ICD-10-CM

## 2024-11-14 PROCEDURE — 99213 OFFICE O/P EST LOW 20 MIN: CPT

## 2024-11-14 PROCEDURE — S9088 SERVICES PROVIDED IN URGENT: HCPCS

## 2024-11-14 PROCEDURE — 71046 X-RAY EXAM CHEST 2 VIEWS: CPT

## 2024-11-14 RX ORDER — PREDNISONE 20 MG/1
40 TABLET ORAL DAILY
Qty: 10 TABLET | Refills: 0 | Status: SHIPPED | OUTPATIENT
Start: 2024-11-14 | End: 2024-11-19

## 2024-11-14 RX ORDER — BENZONATATE 200 MG/1
200 CAPSULE ORAL 3 TIMES DAILY PRN
Qty: 20 CAPSULE | Refills: 0 | Status: SHIPPED | OUTPATIENT
Start: 2024-11-14 | End: 2024-11-14 | Stop reason: CLARIF

## 2024-11-14 RX ORDER — AZITHROMYCIN 250 MG/1
TABLET, FILM COATED ORAL
Qty: 6 TABLET | Refills: 0 | Status: SHIPPED | OUTPATIENT
Start: 2024-11-14 | End: 2024-11-18

## 2024-11-14 RX ORDER — PREDNISONE 20 MG/1
40 TABLET ORAL DAILY
Qty: 10 TABLET | Refills: 0 | Status: SHIPPED | OUTPATIENT
Start: 2024-11-14 | End: 2024-11-14

## 2024-11-14 RX ORDER — BENZONATATE 200 MG/1
200 CAPSULE ORAL 3 TIMES DAILY PRN
Qty: 20 CAPSULE | Refills: 0 | Status: SHIPPED | OUTPATIENT
Start: 2024-11-14 | End: 2024-11-14

## 2024-11-14 RX ORDER — AZITHROMYCIN 250 MG/1
TABLET, FILM COATED ORAL
Qty: 6 TABLET | Refills: 0 | Status: SHIPPED | OUTPATIENT
Start: 2024-11-14 | End: 2024-11-14

## 2024-11-14 NOTE — PATIENT INSTRUCTIONS
Your xray report will have a final reading by a radiologist in the next 24 hours. If there is anything abnormal, the staff will be in contact with you regarding an updated plan of care.    Take the antibiotics and steroids daily as ordered and take until completed.     You may take over the counter Tylenol (Acetaminophen) and/or Motrin (Ibuprofen) as needed, as directed on packaging.     If your symptoms are not getting better in 3-5 days a recheck with pcp is advised     If worsening you will need a follow up with emergency room for further evaluation and treatment.

## 2024-11-14 NOTE — PROGRESS NOTES
[facility]        NAME: Beth Hutchins is a 74 y.o. female  : 1950    MRN: [id number]  DATE: 2024  TIME: 6:28 PM    Assessment and Plan   Acute bronchitis, unspecified organism [J20.9]  1. Acute bronchitis, unspecified organism  XR chest pa and lateral    azithromycin (ZITHROMAX) 250 mg tablet    predniSONE 20 mg tablet    benzonatate (TESSALON) 200 MG capsule        Provider Radiology Interpretation (preliminary)   Final results will be as per official Radiology Report when available:   CHEST: no change when compared to 23 CXR; no pneumonia    Will place on steroids, abx, and cough medicine and advise follow up with pcp in 3-5 days if not improving. If worsening, advised to follow up with emergency room for further eval and treatment.     Tessalon perles verbally called into pharmacy as they would not transmit through to the pharmacy to be ordered. Tessalon 200, TID PRN cough, disp #20, refills 0.     Patient Instructions       Follow up with PCP in 3-5 days.  Proceed to  ER if symptoms worsen.    If tests are performed, our office will contact you with results only if changes need to made to the care plan discussed with you at the visit. [facility]    Chief Complaint     Chief Complaint   Patient presents with    Cough     With phlegm onset  4 days ago here with son who will translate denies fever or chills         History of Present Illness       74-year-old female with past medical history significant for COPD presents with complaint of cough with phlegm production.  Onset 4 days ago.  Denies any fever or chills, activity or appetite disturbance.        Review of Systems   Review of Systems   Respiratory:  Positive for cough.         Increased phlegm production         Current Medications       Current Outpatient Medications:     azithromycin (ZITHROMAX) 250 mg tablet, Take 2 tablets today then 1 tablet daily x 4 days, Disp: 6  tablet, Rfl: 0    benzonatate (TESSALON) 200 MG capsule, Take 1 capsule (200 mg total) by mouth 3 (three) times a day as needed for cough, Disp: 20 capsule, Rfl: 0    predniSONE 20 mg tablet, Take 2 tablets (40 mg total) by mouth daily for 5 days, Disp: 10 tablet, Rfl: 0    albuterol (2.5 mg/3 mL) 0.083 % nebulizer solution, , Disp: , Rfl:     albuterol (PROVENTIL HFA,VENTOLIN HFA) 90 mcg/act inhaler, Inhale 2 puffs every 6 (six) hours as needed, Disp: , Rfl:     ALPRAZolam (XANAX) 1 mg tablet, , Disp: , Rfl:     amLODIPine (NORVASC) 5 mg tablet, , Disp: , Rfl:     Cholecalciferol (Vitamin D) 50 MCG (2000 UT) tablet, , Disp: , Rfl:     fluticasone (FLONASE) 50 mcg/act nasal spray, 2 sprays into each nostril daily, Disp: 11.1 mL, Rfl: 0    fluticasone (FLONASE) 50 mcg/act nasal spray, 2 sprays into each nostril daily, Disp: 11.1 mL, Rfl: 0    Fluticasone-Salmeterol (Advair) 250-50 mcg/dose inhaler, INHALE 1 DOSE BY MOUTH TWICE DAILY, Disp: , Rfl:     ibuprofen (MOTRIN) 600 mg tablet, TAKE 1 TABLET BY MOUTH EVERY 8 HOURS AS NEEDED FOR MILD PAIN (PAIN SCORE 1-3), Disp: , Rfl:     montelukast (SINGULAIR) 10 mg tablet, Take 10 mg by mouth daily, Disp: , Rfl:     omeprazole (PriLOSEC) 40 MG capsule, Take 40 mg by mouth daily, Disp: , Rfl:     promethazine-dextromethorphan (PHENERGAN-DM) 6.25-15 mg/5 mL oral syrup, Take 5 mL by mouth 4 (four) times a day as needed for cough, Disp: 118 mL, Rfl: 0    propranolol (INDERAL) 10 mg tablet, Take 10 mg by mouth daily as needed, Disp: , Rfl:     risedronate (ACTONEL) 35 mg tablet, , Disp: , Rfl:     sertraline (ZOLOFT) 100 mg tablet, , Disp: , Rfl:     traZODone (DESYREL) 100 mg tablet, , Disp: , Rfl:     Current Allergies     Allergies as of 11/14/2024 - Reviewed 11/14/2024   Allergen Reaction Noted    Naproxen Rash and Other (See Comments) 10/24/2011    Naproxen Rash 08/04/2023            The following portions of the patient's history were reviewed and updated as appropriate:  allergies, current medications, past family history, past medical history, past social history, past surgical history and problem list.     Past Medical History:   Diagnosis Date    Breast cancer (HCC)     Depression     Hypertension        Past Surgical History:   Procedure Laterality Date     SECTION      KNEE SURGERY Bilateral     MASTECTOMY Right     SALIVARY GLAND SURGERY         History reviewed. No pertinent family history.      Medications have been verified.        Objective   /86   Pulse 89   Temp 97.8 °F (36.6 °C)   Resp 20   SpO2 95%        Physical Exam     Physical Exam  Vitals and nursing note reviewed. Exam conducted with a chaperone present (Son).   Constitutional:       Appearance: Normal appearance. She is obese.   HENT:      Head: Normocephalic and atraumatic.      Right Ear: Tympanic membrane, ear canal and external ear normal.      Left Ear: Tympanic membrane, ear canal and external ear normal.      Nose: Nose normal.      Mouth/Throat:      Mouth: Mucous membranes are moist.      Pharynx: Oropharynx is clear.   Eyes:      Extraocular Movements: Extraocular movements intact.      Conjunctiva/sclera: Conjunctivae normal.      Pupils: Pupils are equal, round, and reactive to light.   Cardiovascular:      Rate and Rhythm: Normal rate and regular rhythm.      Pulses: Normal pulses.      Heart sounds: Normal heart sounds.   Pulmonary:      Effort: Pulmonary effort is normal. No respiratory distress.      Breath sounds: Decreased air movement present. No stridor. Examination of the right-upper field reveals wheezing. Examination of the left-upper field reveals wheezing. Examination of the right-middle field reveals wheezing. Examination of the right-lower field reveals decreased breath sounds and wheezing. Examination of the left-lower field reveals decreased breath sounds and wheezing. Decreased breath sounds and wheezing (Faint expiratory wheezes scattered throughout) present. No  rhonchi or rales.   Chest:      Chest wall: No tenderness.   Musculoskeletal:         General: Normal range of motion.      Cervical back: Normal range of motion and neck supple.   Skin:     General: Skin is warm and dry.      Capillary Refill: Capillary refill takes less than 2 seconds.   Neurological:      General: No focal deficit present.      Mental Status: She is alert and oriented to person, place, and time.

## 2024-11-15 ENCOUNTER — RESULTS FOLLOW-UP (OUTPATIENT)
Dept: URGENT CARE | Facility: CLINIC | Age: 74
End: 2024-11-15

## 2025-02-03 ENCOUNTER — OFFICE VISIT (OUTPATIENT)
Dept: URGENT CARE | Facility: CLINIC | Age: 75
End: 2025-02-03
Payer: COMMERCIAL

## 2025-02-03 VITALS
HEIGHT: 62 IN | TEMPERATURE: 96.9 F | SYSTOLIC BLOOD PRESSURE: 136 MMHG | RESPIRATION RATE: 15 BRPM | DIASTOLIC BLOOD PRESSURE: 88 MMHG | HEART RATE: 91 BPM | WEIGHT: 233.6 LBS | BODY MASS INDEX: 42.99 KG/M2 | OXYGEN SATURATION: 93 %

## 2025-02-03 DIAGNOSIS — J01.90 ACUTE VIRAL SINUSITIS: Primary | ICD-10-CM

## 2025-02-03 DIAGNOSIS — B97.89 ACUTE VIRAL SINUSITIS: Primary | ICD-10-CM

## 2025-02-03 PROCEDURE — S9088 SERVICES PROVIDED IN URGENT: HCPCS | Performed by: PHYSICIAN ASSISTANT

## 2025-02-03 PROCEDURE — 99213 OFFICE O/P EST LOW 20 MIN: CPT | Performed by: PHYSICIAN ASSISTANT

## 2025-02-03 RX ORDER — PREDNISONE 10 MG/1
TABLET ORAL
Qty: 21 TABLET | Refills: 0 | Status: SHIPPED | OUTPATIENT
Start: 2025-02-03

## 2025-02-03 NOTE — PATIENT INSTRUCTIONS
Viral symptoms may last for a total of 1-2 weeks. Symptoms typically start with a sore throat and progress to include sinus pain/pressure, runny nose (yellow/green), and congestion/cough. The yellow/green color does not necessarily indicate a bacterial sinus infection. If your sinus symptoms worsen on day 10-14, you may want to consider re-evaluation for a possible secondary bacterial sinus infection. Coughs are typically most bothersome the first 1-2 weeks. Coughs frequently linger for 4-6 weeks. However, have your lungs re-evaluated if you develop sudden worsening cough, shortness or breath or chest discomfort.       Vitamin D3 2000 IU daily  Vitamin C 1000mg twice per day  Some studies suggest that Zinc 12.5-15mg every 2 hours while awake x 5 days may shorten the duration cold symptoms by 1-2 days.   Fluids and rest  Nasal saline spray (Extra Strength) 3 drops in each nostril 4x per day may shorten the duration of illness by 1-2 days and help prevent spread.  Over the counter cold medication as needed (EX: Coricidin HBP)  Sinus Rinses (used distilled water only and carefully follow instructions)  Salt water gargles and chloraseptic spray  Throat Coat Tea (herbal licorice root tea for sore throat-add honey unless diabetic)  Warm compresses over sinuses  Steam treatment (utilize proper safety precautions when in contact with hot water/steam)      Follow up with PCP in 3-5 days.  Proceed to  ER if symptoms worsen.    If tests have been performed at [facility], our office will contact you with results if changes need to be made to the care plan discussed with you at the visit.  You can review your full results on [facility].

## 2025-02-03 NOTE — PROGRESS NOTES
[facility]        NAME: Beth Hutchins is a 74 y.o. female  : 1950    MRN: [id number]  DATE: February 3, 2025  TIME: 4:37 PM    Assessment and Plan   Acute viral sinusitis [J01.90, B97.89]  1. Acute viral sinusitis  predniSONE 10 mg tablet          Results  Rx O2: 99        Patient Instructions     Assessment & Plan    Viral symptoms may last for a total of 1-2 weeks. Symptoms typically start with a sore throat and progress to include sinus pain/pressure, runny nose (yellow/green), and congestion/cough. The yellow/green color does not necessarily indicate a bacterial sinus infection. If your sinus symptoms worsen on day 10-14, you may want to consider re-evaluation for a possible secondary bacterial sinus infection. Coughs are typically most bothersome the first 1-2 weeks. Coughs frequently linger for 4-6 weeks. However, have your lungs re-evaluated if you develop sudden worsening cough, shortness or breath or chest discomfort.       Vitamin D3 2000 IU daily  Vitamin C 1000mg twice per day  Some studies suggest that Zinc 12.5-15mg every 2 hours while awake x 5 days may shorten the duration cold symptoms by 1-2 days.   Fluids and rest  Nasal saline spray (Extra Strength) 3 drops in each nostril 4x per day may shorten the duration of illness by 1-2 days and help prevent spread.  Over the counter cold medication as needed (EX: Coricidin HBP)  Sinus Rinses (used distilled water only and carefully follow instructions)  Salt water gargles and chloraseptic spray  Throat Coat Tea (herbal licorice root tea for sore throat-add honey unless diabetic)  Warm compresses over sinuses  Steam treatment (utilize proper safety precautions when in contact with hot water/steam)      Follow up with PCP in 3-5 days.  Proceed to  ER if symptoms worsen.    If tests have been performed at [facility], our office will contact you with results if changes need to be made to the care plan discussed with you at the visit.   You can review your full results on [facility].    Chief Complaint     Chief Complaint   Patient presents with    Nasal Congestion     Started 3 days ago. OTC nasal spray and inhaler. Both ears hurt.     Earache    Facial Pain         History of Present Illness     History of Present Illness      URI   This is a new problem. Episode onset: 3d. Associated symptoms include congestion, ear pain and sinus pain. Pertinent negatives include no coughing, diarrhea, headaches, nausea, rash, rhinorrhea, sore throat or vomiting. Treatments tried: Flonase.       Review of Systems   Review of Systems   Constitutional:  Negative for chills and fever.   HENT:  Positive for congestion, ear pain, sinus pressure and sinus pain. Negative for ear discharge, hearing loss, postnasal drip, rhinorrhea, sore throat, tinnitus and trouble swallowing.    Respiratory:  Positive for chest tightness. Negative for cough and shortness of breath.    Gastrointestinal:  Negative for diarrhea, nausea and vomiting.   Musculoskeletal:  Negative for myalgias.   Skin:  Negative for rash.   Neurological:  Negative for headaches.         Current Medications       Current Outpatient Medications:     albuterol (2.5 mg/3 mL) 0.083 % nebulizer solution, , Disp: , Rfl:     albuterol (PROVENTIL HFA,VENTOLIN HFA) 90 mcg/act inhaler, Inhale 2 puffs every 6 (six) hours as needed, Disp: , Rfl:     ALPRAZolam (XANAX) 1 mg tablet, , Disp: , Rfl:     amLODIPine (NORVASC) 5 mg tablet, , Disp: , Rfl:     Cholecalciferol (Vitamin D) 50 MCG (2000 UT) tablet, , Disp: , Rfl:     fluticasone (FLONASE) 50 mcg/act nasal spray, 2 sprays into each nostril daily, Disp: 11.1 mL, Rfl: 0    Fluticasone-Salmeterol (Advair) 250-50 mcg/dose inhaler, INHALE 1 DOSE BY MOUTH TWICE DAILY, Disp: , Rfl:     ibuprofen (MOTRIN) 600 mg tablet, TAKE 1 TABLET BY MOUTH EVERY 8 HOURS AS NEEDED FOR MILD PAIN (PAIN SCORE 1-3), Disp: , Rfl:     montelukast (SINGULAIR) 10 mg tablet, Take 10 mg by  "mouth daily, Disp: , Rfl:     omeprazole (PriLOSEC) 40 MG capsule, Take 40 mg by mouth daily, Disp: , Rfl:     predniSONE 10 mg tablet, Take 6 pills day one, 5 pills day 2, 4 pills day 3, 3 pills day 4, 2 pills day 5, and 1 pill day 6., Disp: 21 tablet, Rfl: 0    propranolol (INDERAL) 10 mg tablet, Take 10 mg by mouth daily as needed, Disp: , Rfl:     risedronate (ACTONEL) 35 mg tablet, , Disp: , Rfl:     sertraline (ZOLOFT) 100 mg tablet, , Disp: , Rfl:     traZODone (DESYREL) 100 mg tablet, , Disp: , Rfl:     fluticasone (FLONASE) 50 mcg/act nasal spray, 2 sprays into each nostril daily (Patient not taking: Reported on 2/3/2025), Disp: 11.1 mL, Rfl: 0    promethazine-dextromethorphan (PHENERGAN-DM) 6.25-15 mg/5 mL oral syrup, Take 5 mL by mouth 4 (four) times a day as needed for cough (Patient not taking: Reported on 2/3/2025), Disp: 118 mL, Rfl: 0    Current Allergies     Allergies as of 2025 - Reviewed 2025   Allergen Reaction Noted    Naproxen Rash and Other (See Comments) 10/24/2011    Naproxen Rash 2023            The following portions of the patient's history were reviewed and updated as appropriate: allergies, current medications, past family history, past medical history, past social history, past surgical history and problem list.     Past Medical History:   Diagnosis Date    Asthma     Breast cancer (HCC)     Depression     Hypertension        Past Surgical History:   Procedure Laterality Date     SECTION      CHOLECYSTECTOMY      KNEE SURGERY Bilateral     MASTECTOMY Right     SALIVARY GLAND SURGERY         Family History   Problem Relation Age of Onset    No Known Problems Mother     No Known Problems Father          Medications have been verified.        Objective   /88   Pulse 91   Temp (!) 96.9 °F (36.1 °C)   Resp 15   Ht 5' 2\" (1.575 m)   Wt 106 kg (233 lb 9.6 oz)   SpO2 93%   BMI 42.73 kg/m²   No LMP recorded. Patient is postmenopausal.       Physical Exam "     Physical Exam  Vitals reviewed.   Constitutional:       General: She is not in acute distress.     Appearance: She is well-developed. She is not diaphoretic.   HENT:      Head: Normocephalic and atraumatic.      Comments: B/L frontal and maxillary sinus TTP     Right Ear: Tympanic membrane, ear canal and external ear normal.      Left Ear: Tympanic membrane, ear canal and external ear normal.      Nose: Nose normal.      Mouth/Throat:      Pharynx: No oropharyngeal exudate or posterior oropharyngeal erythema.   Eyes:      General:         Right eye: No discharge.         Left eye: No discharge.   Cardiovascular:      Rate and Rhythm: Normal rate and regular rhythm.      Heart sounds: Normal heart sounds. No murmur heard.     No friction rub. No gallop.   Pulmonary:      Effort: Pulmonary effort is normal. No respiratory distress.      Breath sounds: Normal breath sounds. No wheezing, rhonchi or rales.   Lymphadenopathy:      Cervical: No cervical adenopathy.   Skin:     General: Skin is warm.      Findings: No rash.   Neurological:      Mental Status: She is alert.   Psychiatric:         Behavior: Behavior normal.         Thought Content: Thought content normal.         Judgment: Judgment normal.